# Patient Record
Sex: FEMALE | Race: BLACK OR AFRICAN AMERICAN | NOT HISPANIC OR LATINO | Employment: FULL TIME
[De-identification: names, ages, dates, MRNs, and addresses within clinical notes are randomized per-mention and may not be internally consistent; named-entity substitution may affect disease eponyms.]

---

## 2017-06-01 ENCOUNTER — SURGERY (OUTPATIENT)
Age: 57
End: 2017-06-01

## 2017-06-01 ENCOUNTER — ANESTHESIA (OUTPATIENT)
Dept: SURGERY | Facility: HOSPITAL | Age: 57
DRG: 572 | End: 2017-06-01
Payer: COMMERCIAL

## 2017-06-01 ENCOUNTER — HOSPITAL ENCOUNTER (INPATIENT)
Facility: HOSPITAL | Age: 57
LOS: 3 days | Discharge: HOME | DRG: 572 | End: 2017-06-04
Attending: EMERGENCY MEDICINE | Admitting: SURGERY
Payer: COMMERCIAL

## 2017-06-01 ENCOUNTER — ANESTHESIA EVENT (OUTPATIENT)
Dept: SURGERY | Facility: HOSPITAL | Age: 57
DRG: 572 | End: 2017-06-01
Payer: COMMERCIAL

## 2017-06-01 DIAGNOSIS — L02.411 ABSCESS OF AXILLA, RIGHT: Primary | ICD-10-CM

## 2017-06-01 LAB
ALBUMIN SERPL BCP-MCNC: 3.4 G/DL
ALP SERPL-CCNC: 61 U/L
ALT SERPL W/O P-5'-P-CCNC: 14 U/L
ANION GAP SERPL CALC-SCNC: 9 MMOL/L
AST SERPL-CCNC: 12 U/L
BASOPHILS # BLD AUTO: 0.02 K/UL
BASOPHILS NFR BLD: 0.1 %
BILIRUB SERPL-MCNC: 0.8 MG/DL
BUN SERPL-MCNC: 10 MG/DL
CALCIUM SERPL-MCNC: 9.8 MG/DL
CHLORIDE SERPL-SCNC: 104 MMOL/L
CO2 SERPL-SCNC: 27 MMOL/L
CREAT SERPL-MCNC: 1 MG/DL
DIFFERENTIAL METHOD: ABNORMAL
EOSINOPHIL # BLD AUTO: 0.4 K/UL
EOSINOPHIL NFR BLD: 3 %
ERYTHROCYTE [DISTWIDTH] IN BLOOD BY AUTOMATED COUNT: 14.2 %
EST. GFR  (AFRICAN AMERICAN): >60 ML/MIN/1.73 M^2
EST. GFR  (NON AFRICAN AMERICAN): >60 ML/MIN/1.73 M^2
GLUCOSE SERPL-MCNC: 113 MG/DL
HCT VFR BLD AUTO: 39.2 %
HGB BLD-MCNC: 13.2 G/DL
LYMPHOCYTES # BLD AUTO: 2.1 K/UL
LYMPHOCYTES NFR BLD: 14.9 %
MCH RBC QN AUTO: 30.6 PG
MCHC RBC AUTO-ENTMCNC: 33.7 %
MCV RBC AUTO: 91 FL
MONOCYTES # BLD AUTO: 0.6 K/UL
MONOCYTES NFR BLD: 4.6 %
NEUTROPHILS # BLD AUTO: 10.7 K/UL
NEUTROPHILS NFR BLD: 77.4 %
PLATELET # BLD AUTO: 163 K/UL
PMV BLD AUTO: 11.5 FL
POTASSIUM SERPL-SCNC: 4 MMOL/L
PROT SERPL-MCNC: 7.1 G/DL
RBC # BLD AUTO: 4.31 M/UL
SODIUM SERPL-SCNC: 140 MMOL/L
WBC # BLD AUTO: 13.77 K/UL

## 2017-06-01 PROCEDURE — 25000003 PHARM REV CODE 250: Performed by: NURSE ANESTHETIST, CERTIFIED REGISTERED

## 2017-06-01 PROCEDURE — 85025 COMPLETE CBC W/AUTO DIFF WBC: CPT

## 2017-06-01 PROCEDURE — 63600175 PHARM REV CODE 636 W HCPCS: Performed by: SURGERY

## 2017-06-01 PROCEDURE — 80053 COMPREHEN METABOLIC PANEL: CPT

## 2017-06-01 PROCEDURE — 88304 TISSUE EXAM BY PATHOLOGIST: CPT | Mod: 26,,, | Performed by: PATHOLOGY

## 2017-06-01 PROCEDURE — 99222 1ST HOSP IP/OBS MODERATE 55: CPT | Mod: 25,,, | Performed by: PHYSICIAN ASSISTANT

## 2017-06-01 PROCEDURE — 11000001 HC ACUTE MED/SURG PRIVATE ROOM

## 2017-06-01 PROCEDURE — 0JBD0ZZ EXCISION OF RIGHT UPPER ARM SUBCUTANEOUS TISSUE AND FASCIA, OPEN APPROACH: ICD-10-PCS | Performed by: SURGERY

## 2017-06-01 PROCEDURE — 25000003 PHARM REV CODE 250: Performed by: SURGERY

## 2017-06-01 PROCEDURE — 99900035 HC TECH TIME PER 15 MIN (STAT)

## 2017-06-01 PROCEDURE — 10061 I&D ABSCESS COMP/MULTIPLE: CPT | Mod: ,,, | Performed by: SURGERY

## 2017-06-01 PROCEDURE — 27000221 HC OXYGEN, UP TO 24 HOURS

## 2017-06-01 PROCEDURE — 25000003 PHARM REV CODE 250: Performed by: ANESTHESIOLOGY

## 2017-06-01 PROCEDURE — 87075 CULTR BACTERIA EXCEPT BLOOD: CPT

## 2017-06-01 PROCEDURE — 87070 CULTURE OTHR SPECIMN AEROBIC: CPT

## 2017-06-01 PROCEDURE — 36000705 HC OR TIME LEV I EA ADD 15 MIN: Performed by: SURGERY

## 2017-06-01 PROCEDURE — 88304 TISSUE EXAM BY PATHOLOGIST: CPT | Performed by: PATHOLOGY

## 2017-06-01 PROCEDURE — 63600175 PHARM REV CODE 636 W HCPCS: Performed by: NURSE ANESTHETIST, CERTIFIED REGISTERED

## 2017-06-01 PROCEDURE — 37000009 HC ANESTHESIA EA ADD 15 MINS: Performed by: SURGERY

## 2017-06-01 PROCEDURE — 63600175 PHARM REV CODE 636 W HCPCS: Performed by: ANESTHESIOLOGY

## 2017-06-01 PROCEDURE — 71000033 HC RECOVERY, INTIAL HOUR: Performed by: SURGERY

## 2017-06-01 PROCEDURE — 37000008 HC ANESTHESIA 1ST 15 MINUTES: Performed by: SURGERY

## 2017-06-01 PROCEDURE — 36000704 HC OR TIME LEV I 1ST 15 MIN: Performed by: SURGERY

## 2017-06-01 PROCEDURE — 99283 EMERGENCY DEPT VISIT LOW MDM: CPT

## 2017-06-01 RX ORDER — CHOLECALCIFEROL (VITAMIN D3) 25 MCG
1000 TABLET ORAL DAILY
COMMUNITY
End: 2017-07-25

## 2017-06-01 RX ORDER — ALLOPURINOL 300 MG/1
300 TABLET ORAL DAILY
COMMUNITY
End: 2017-07-25

## 2017-06-01 RX ORDER — BUPIVACAINE HYDROCHLORIDE 2.5 MG/ML
INJECTION, SOLUTION EPIDURAL; INFILTRATION; INTRACAUDAL
Status: DISCONTINUED | OUTPATIENT
Start: 2017-06-01 | End: 2017-06-01 | Stop reason: HOSPADM

## 2017-06-01 RX ORDER — LACTULOSE 10 G/15ML
20 SOLUTION ORAL EVERY 6 HOURS PRN
Status: DISCONTINUED | OUTPATIENT
Start: 2017-06-01 | End: 2017-06-03

## 2017-06-01 RX ORDER — FENTANYL CITRATE 50 UG/ML
INJECTION, SOLUTION INTRAMUSCULAR; INTRAVENOUS
Status: DISCONTINUED | OUTPATIENT
Start: 2017-06-01 | End: 2017-06-01

## 2017-06-01 RX ORDER — TRAMADOL HYDROCHLORIDE 50 MG/1
50 TABLET ORAL EVERY 6 HOURS PRN
COMMUNITY
End: 2019-12-11 | Stop reason: ALTCHOICE

## 2017-06-01 RX ORDER — ONDANSETRON 8 MG/1
8 TABLET, ORALLY DISINTEGRATING ORAL EVERY 8 HOURS PRN
Status: DISCONTINUED | OUTPATIENT
Start: 2017-06-01 | End: 2017-06-03

## 2017-06-01 RX ORDER — ONDANSETRON 2 MG/ML
INJECTION INTRAMUSCULAR; INTRAVENOUS
Status: DISCONTINUED | OUTPATIENT
Start: 2017-06-01 | End: 2017-06-01

## 2017-06-01 RX ORDER — ONDANSETRON 2 MG/ML
8 INJECTION INTRAMUSCULAR; INTRAVENOUS EVERY 8 HOURS PRN
Status: DISCONTINUED | OUTPATIENT
Start: 2017-06-01 | End: 2017-06-01

## 2017-06-01 RX ORDER — SODIUM CHLORIDE 9 MG/ML
3 INJECTION, SOLUTION INTRAMUSCULAR; INTRAVENOUS; SUBCUTANEOUS
Status: DISCONTINUED | OUTPATIENT
Start: 2017-06-01 | End: 2017-06-01 | Stop reason: HOSPADM

## 2017-06-01 RX ORDER — ALPRAZOLAM 0.5 MG/1
0.5 TABLET ORAL 3 TIMES DAILY
COMMUNITY
End: 2017-07-25

## 2017-06-01 RX ORDER — DEXAMETHASONE SODIUM PHOSPHATE 4 MG/ML
INJECTION, SOLUTION INTRA-ARTICULAR; INTRALESIONAL; INTRAMUSCULAR; INTRAVENOUS; SOFT TISSUE
Status: DISCONTINUED | OUTPATIENT
Start: 2017-06-01 | End: 2017-06-01

## 2017-06-01 RX ORDER — DIPHENHYDRAMINE HYDROCHLORIDE 50 MG/ML
25 INJECTION INTRAMUSCULAR; INTRAVENOUS EVERY 4 HOURS PRN
Status: DISCONTINUED | OUTPATIENT
Start: 2017-06-01 | End: 2017-06-03

## 2017-06-01 RX ORDER — MORPHINE SULFATE 2 MG/ML
2 INJECTION, SOLUTION INTRAMUSCULAR; INTRAVENOUS
Status: DISCONTINUED | OUTPATIENT
Start: 2017-06-01 | End: 2017-06-03

## 2017-06-01 RX ORDER — MEPERIDINE HYDROCHLORIDE 50 MG/ML
12.5 INJECTION INTRAMUSCULAR; INTRAVENOUS; SUBCUTANEOUS ONCE AS NEEDED
Status: COMPLETED | OUTPATIENT
Start: 2017-06-01 | End: 2017-06-01

## 2017-06-01 RX ORDER — OXYCODONE HYDROCHLORIDE 5 MG/1
5 TABLET ORAL
Status: DISCONTINUED | OUTPATIENT
Start: 2017-06-01 | End: 2017-06-01 | Stop reason: HOSPADM

## 2017-06-01 RX ORDER — CLONIDINE HYDROCHLORIDE 0.1 MG/1
0.1 TABLET ORAL EVERY 6 HOURS PRN
Status: DISCONTINUED | OUTPATIENT
Start: 2017-06-01 | End: 2017-06-04 | Stop reason: HOSPADM

## 2017-06-01 RX ORDER — PROPOFOL 10 MG/ML
VIAL (ML) INTRAVENOUS
Status: DISCONTINUED | OUTPATIENT
Start: 2017-06-01 | End: 2017-06-01

## 2017-06-01 RX ORDER — POTASSIUM CHLORIDE 20 MEQ/1
20 TABLET, EXTENDED RELEASE ORAL DAILY
COMMUNITY
End: 2020-07-12

## 2017-06-01 RX ORDER — METOPROLOL SUCCINATE 50 MG/1
100 TABLET, EXTENDED RELEASE ORAL DAILY
Status: DISCONTINUED | OUTPATIENT
Start: 2017-06-02 | End: 2017-06-04 | Stop reason: HOSPADM

## 2017-06-01 RX ORDER — SUCCINYLCHOLINE CHLORIDE 20 MG/ML
INJECTION INTRAMUSCULAR; INTRAVENOUS
Status: DISCONTINUED | OUTPATIENT
Start: 2017-06-01 | End: 2017-06-01

## 2017-06-01 RX ORDER — RAMELTEON 8 MG/1
8 TABLET ORAL NIGHTLY PRN
Status: DISCONTINUED | OUTPATIENT
Start: 2017-06-01 | End: 2017-06-03

## 2017-06-01 RX ORDER — POTASSIUM CHLORIDE 20 MEQ/1
20 TABLET, EXTENDED RELEASE ORAL DAILY
Status: DISCONTINUED | OUTPATIENT
Start: 2017-06-02 | End: 2017-06-04 | Stop reason: HOSPADM

## 2017-06-01 RX ORDER — ACETAMINOPHEN 325 MG/1
650 TABLET ORAL EVERY 6 HOURS PRN
Status: DISCONTINUED | OUTPATIENT
Start: 2017-06-01 | End: 2017-06-03

## 2017-06-01 RX ORDER — HYDROMORPHONE HYDROCHLORIDE 2 MG/ML
0.2 INJECTION, SOLUTION INTRAMUSCULAR; INTRAVENOUS; SUBCUTANEOUS EVERY 5 MIN PRN
Status: DISCONTINUED | OUTPATIENT
Start: 2017-06-01 | End: 2017-06-01 | Stop reason: HOSPADM

## 2017-06-01 RX ORDER — METOPROLOL SUCCINATE 100 MG/1
100 TABLET, EXTENDED RELEASE ORAL 2 TIMES DAILY
COMMUNITY
End: 2020-07-12

## 2017-06-01 RX ORDER — OLMESARTAN MEDOXOMIL / AMLODIPINE BESYLATE / HYDROCHLOROTHIAZIDE 40; 10; 12.5 MG/1; MG/1; MG/1
TABLET, FILM COATED ORAL
COMMUNITY
End: 2020-04-13

## 2017-06-01 RX ORDER — CLINDAMYCIN PHOSPHATE 900 MG/50ML
900 INJECTION, SOLUTION INTRAVENOUS
Status: DISCONTINUED | OUTPATIENT
Start: 2017-06-01 | End: 2017-06-04 | Stop reason: HOSPADM

## 2017-06-01 RX ORDER — DIPHENHYDRAMINE HYDROCHLORIDE 50 MG/ML
25 INJECTION INTRAMUSCULAR; INTRAVENOUS EVERY 6 HOURS PRN
Status: DISCONTINUED | OUTPATIENT
Start: 2017-06-01 | End: 2017-06-01 | Stop reason: HOSPADM

## 2017-06-01 RX ORDER — DOXAZOSIN 2 MG/1
2 TABLET ORAL NIGHTLY
Status: DISCONTINUED | OUTPATIENT
Start: 2017-06-01 | End: 2017-06-04 | Stop reason: HOSPADM

## 2017-06-01 RX ORDER — FENTANYL CITRATE 50 UG/ML
25 INJECTION, SOLUTION INTRAMUSCULAR; INTRAVENOUS EVERY 5 MIN PRN
Status: DISCONTINUED | OUTPATIENT
Start: 2017-06-01 | End: 2017-06-01 | Stop reason: HOSPADM

## 2017-06-01 RX ORDER — LIDOCAINE HCL/PF 100 MG/5ML
SYRINGE (ML) INTRAVENOUS
Status: DISCONTINUED | OUTPATIENT
Start: 2017-06-01 | End: 2017-06-01

## 2017-06-01 RX ORDER — SODIUM CHLORIDE, SODIUM LACTATE, POTASSIUM CHLORIDE, CALCIUM CHLORIDE 600; 310; 30; 20 MG/100ML; MG/100ML; MG/100ML; MG/100ML
INJECTION, SOLUTION INTRAVENOUS CONTINUOUS PRN
Status: DISCONTINUED | OUTPATIENT
Start: 2017-06-01 | End: 2017-06-01

## 2017-06-01 RX ORDER — HYDROCODONE BITARTRATE AND ACETAMINOPHEN 5; 325 MG/1; MG/1
1 TABLET ORAL EVERY 4 HOURS PRN
Status: DISCONTINUED | OUTPATIENT
Start: 2017-06-01 | End: 2017-06-03

## 2017-06-01 RX ORDER — BISACODYL 10 MG
10 SUPPOSITORY, RECTAL RECTAL DAILY PRN
Status: DISCONTINUED | OUTPATIENT
Start: 2017-06-01 | End: 2017-06-03

## 2017-06-01 RX ORDER — ONDANSETRON 2 MG/ML
4 INJECTION INTRAMUSCULAR; INTRAVENOUS DAILY PRN
Status: DISCONTINUED | OUTPATIENT
Start: 2017-06-01 | End: 2017-06-01 | Stop reason: HOSPADM

## 2017-06-01 RX ORDER — MORPHINE SULFATE 2 MG/ML
1 INJECTION, SOLUTION INTRAMUSCULAR; INTRAVENOUS EVERY 4 HOURS PRN
Status: DISCONTINUED | OUTPATIENT
Start: 2017-06-01 | End: 2017-06-01

## 2017-06-01 RX ORDER — OMEPRAZOLE 20 MG/1
20 CAPSULE, DELAYED RELEASE ORAL DAILY
COMMUNITY

## 2017-06-01 RX ORDER — SODIUM CHLORIDE, SODIUM LACTATE, POTASSIUM CHLORIDE, CALCIUM CHLORIDE 600; 310; 30; 20 MG/100ML; MG/100ML; MG/100ML; MG/100ML
INJECTION, SOLUTION INTRAVENOUS CONTINUOUS
Status: DISCONTINUED | OUTPATIENT
Start: 2017-06-01 | End: 2017-06-03

## 2017-06-01 RX ORDER — DOXAZOSIN 2 MG/1
2 TABLET ORAL NIGHTLY
COMMUNITY
End: 2020-01-17

## 2017-06-01 RX ORDER — ACETAMINOPHEN 10 MG/ML
1000 INJECTION, SOLUTION INTRAVENOUS ONCE
Status: COMPLETED | OUTPATIENT
Start: 2017-06-01 | End: 2017-06-01

## 2017-06-01 RX ORDER — METRONIDAZOLE 500 MG/100ML
500 INJECTION, SOLUTION INTRAVENOUS
Status: DISCONTINUED | OUTPATIENT
Start: 2017-06-01 | End: 2017-06-04 | Stop reason: HOSPADM

## 2017-06-01 RX ORDER — LORAZEPAM 2 MG/ML
0.25 INJECTION INTRAMUSCULAR ONCE AS NEEDED
Status: DISCONTINUED | OUTPATIENT
Start: 2017-06-01 | End: 2017-06-01 | Stop reason: HOSPADM

## 2017-06-01 RX ORDER — ROCURONIUM BROMIDE 10 MG/ML
INJECTION, SOLUTION INTRAVENOUS
Status: DISCONTINUED | OUTPATIENT
Start: 2017-06-01 | End: 2017-06-01

## 2017-06-01 RX ORDER — SODIUM CHLORIDE 9 MG/ML
3 INJECTION, SOLUTION INTRAMUSCULAR; INTRAVENOUS; SUBCUTANEOUS EVERY 8 HOURS
Status: DISCONTINUED | OUTPATIENT
Start: 2017-06-01 | End: 2017-06-01 | Stop reason: HOSPADM

## 2017-06-01 RX ORDER — AMOXICILLIN 250 MG
1 CAPSULE ORAL 2 TIMES DAILY
Status: DISCONTINUED | OUTPATIENT
Start: 2017-06-01 | End: 2017-06-03

## 2017-06-01 RX ADMIN — STANDARDIZED SENNA CONCENTRATE AND DOCUSATE SODIUM 1 TABLET: 8.6; 5 TABLET, FILM COATED ORAL at 10:06

## 2017-06-01 RX ADMIN — DEXAMETHASONE SODIUM PHOSPHATE 4 MG: 4 INJECTION, SOLUTION INTRA-ARTICULAR; INTRALESIONAL; INTRAMUSCULAR; INTRAVENOUS; SOFT TISSUE at 02:06

## 2017-06-01 RX ADMIN — FENTANYL CITRATE 50 MCG: 50 INJECTION, SOLUTION INTRAMUSCULAR; INTRAVENOUS at 03:06

## 2017-06-01 RX ADMIN — BUPIVACAINE HYDROCHLORIDE 30 ML: 2.5 INJECTION, SOLUTION EPIDURAL; INFILTRATION; INTRACAUDAL; PERINEURAL at 02:06

## 2017-06-01 RX ADMIN — MEPERIDINE HYDROCHLORIDE 12.5 MG: 50 INJECTION INTRAMUSCULAR; INTRAVENOUS; SUBCUTANEOUS at 03:06

## 2017-06-01 RX ADMIN — METRONIDAZOLE 500 MG: 500 INJECTION, SOLUTION INTRAVENOUS at 04:06

## 2017-06-01 RX ADMIN — PROPOFOL 160 MG: 10 INJECTION, EMULSION INTRAVENOUS at 02:06

## 2017-06-01 RX ADMIN — CLINDAMYCIN IN 5 PERCENT DEXTROSE 900 MG: 18 INJECTION, SOLUTION INTRAVENOUS at 04:06

## 2017-06-01 RX ADMIN — HYDROCODONE BITARTRATE AND ACETAMINOPHEN 1 TABLET: 5; 325 TABLET ORAL at 10:06

## 2017-06-01 RX ADMIN — PROMETHAZINE HYDROCHLORIDE 6.25 MG: 25 INJECTION INTRAMUSCULAR; INTRAVENOUS at 05:06

## 2017-06-01 RX ADMIN — SUCCINYLCHOLINE CHLORIDE 100 MG: 20 INJECTION, SOLUTION INTRAMUSCULAR; INTRAVENOUS at 02:06

## 2017-06-01 RX ADMIN — ACETAMINOPHEN 1000 MG: 10 INJECTION, SOLUTION INTRAVENOUS at 03:06

## 2017-06-01 RX ADMIN — HYDROMORPHONE HYDROCHLORIDE 0.2 MG: 2 INJECTION, SOLUTION INTRAMUSCULAR; INTRAVENOUS; SUBCUTANEOUS at 03:06

## 2017-06-01 RX ADMIN — SODIUM CHLORIDE, SODIUM LACTATE, POTASSIUM CHLORIDE, AND CALCIUM CHLORIDE: 600; 310; 30; 20 INJECTION, SOLUTION INTRAVENOUS at 02:06

## 2017-06-01 RX ADMIN — FENTANYL CITRATE 50 MCG: 50 INJECTION, SOLUTION INTRAMUSCULAR; INTRAVENOUS at 02:06

## 2017-06-01 RX ADMIN — ONDANSETRON 4 MG: 2 INJECTION, SOLUTION INTRAMUSCULAR; INTRAVENOUS at 02:06

## 2017-06-01 RX ADMIN — LIDOCAINE HYDROCHLORIDE 40 MG: 20 INJECTION, SOLUTION INTRAVENOUS at 02:06

## 2017-06-01 RX ADMIN — SODIUM CHLORIDE, SODIUM LACTATE, POTASSIUM CHLORIDE, AND CALCIUM CHLORIDE: .6; .31; .03; .02 INJECTION, SOLUTION INTRAVENOUS at 04:06

## 2017-06-01 RX ADMIN — ROCURONIUM BROMIDE 5 MG: 10 INJECTION, SOLUTION INTRAVENOUS at 02:06

## 2017-06-01 RX ADMIN — RAMELTEON 8 MG: 8 TABLET, FILM COATED ORAL at 10:06

## 2017-06-01 NOTE — TRANSFER OF CARE
"Anesthesia Transfer of Care Note    Patient: Dianne Hernández    Procedure(s) Performed: Procedure(s) (LRB):  INCISION AND DRAINAGE (I&D), ABSCESS (Right)    Patient location: PACU    Anesthesia Type: general    Transport from OR: Transported from OR on room air with adequate spontaneous ventilation    Post pain: adequate analgesia    Post assessment: no apparent anesthetic complications    Post vital signs: stable    Level of consciousness: responds to stimulation    Nausea/Vomiting: no nausea/vomiting    Complications: none    Transfer of care protocol was followed      Last vitals:   Visit Vitals  BP (!) 108/59 (BP Location: Right arm, Patient Position: Lying, BP Method: Automatic)   Pulse 81   Temp 37.1 °C (98.8 °F) (Oral)   Resp 18   Ht 5' 7" (1.702 m)   Wt 109.8 kg (242 lb)   SpO2 96%   Breastfeeding? No   BMI 37.90 kg/m²     "

## 2017-06-01 NOTE — HOSPITAL COURSE
The patient was admitted for I&D 06/01/2017  She underwent debridement of right axillary abscess.  Stable for discharge on 6/4/17.

## 2017-06-01 NOTE — ANESTHESIA RELEASE NOTE
"Anesthesia Release from PACU Note    Patient: Dianne Hernández    Procedure(s) Performed: Procedure(s) (LRB):  INCISION AND DRAINAGE (I&D), ABSCESS (Right)    Anesthesia type: general    Post pain: Adequate analgesia    Post assessment: no apparent anesthetic complications, tolerated procedure well and no evidence of recall    Last Vitals:   Visit Vitals  BP (!) 90/55   Pulse 70   Temp 36.6 °C (97.9 °F) (Temporal)   Resp 18   Ht 5' 7" (1.702 m)   Wt 109.8 kg (242 lb)   SpO2 98%   Breastfeeding? No   BMI 37.90 kg/m²       Post vital signs: stable    Level of consciousness: responds to stimulation    Nausea/Vomiting: no nausea/no vomiting    Complications: none    Airway Patency: patent    Respiratory: unassisted    Cardiovascular: stable and blood pressure at baseline    Hydration: euvolemic  "

## 2017-06-01 NOTE — ANESTHESIA PREPROCEDURE EVALUATION
06/01/2017  Dianne Hernández is a 56 y.o., female.    Anesthesia Evaluation    I have reviewed the Patient Summary Reports.    I have reviewed the Nursing Notes.   I have reviewed the Medications.     Review of Systems  Anesthesia Hx:  No problems with previous Anesthesia  Denies Family Hx of Anesthesia complications.   Denies Personal Hx of Anesthesia complications.   Social:  Non-Smoker    Hematology/Oncology:     Oncology Normal     EENT/Dental:EENT/Dental Normal   Cardiovascular:   Exercise tolerance: good Hypertension, well controlled    Pulmonary:  Pulmonary Normal    Renal/:  Renal/ Normal     Hepatic/GI:   GERD, well controlled    Musculoskeletal:  Musculoskeletal Normal    Neurological:  Neurology Normal    Endocrine:  Endocrine Normal    Dermatological:  Skin Normal    Psych:  Psychiatric Normal           Physical Exam  General:  Morbid Obesity    Airway/Jaw/Neck:  Airway Findings: Tongue: Normal, Large General Airway Assessment: Adult, Average  Mallampati: II  TM Distance: Normal, at least 6 cm      Dental:  Dental Findings: In tact   Chest/Lungs:  Chest/Lungs Findings: Clear to auscultation, Normal Respiratory Rate     Heart/Vascular:  Heart Findings: Rate: Normal  Rhythm: Regular Rhythm  Sounds: Normal        Mental Status:  Mental Status Findings:  Cooperative, Alert and Oriented         Anesthesia Plan  Type of Anesthesia, risks & benefits discussed:  Anesthesia Type:  general  Patient's Preference:   Intra-op Monitoring Plan:   Intra-op Monitoring Plan Comments:   Post Op Pain Control Plan:   Post Op Pain Control Plan Comments:   Induction:   IV  Beta Blocker:  Patient is on a Beta-Blocker and has received one dose within the past 24 hours (No further documentation required).       Informed Consent: Patient understands risks and agrees with Anesthesia plan.  Questions answered. Anesthesia  consent signed with patient.  ASA Score: 2     Day of Surgery Review of History & Physical: I have interviewed and examined the patient. I have reviewed the patient's H&P dated: 6/1/17. There are no significant changes.  H&P update referred to the surgeon.         Ready For Surgery From Anesthesia Perspective.

## 2017-06-01 NOTE — PLAN OF CARE
Pt lying in bed. Pt reports a little bit of pain. Family updated upon pt arrival to recovery room.

## 2017-06-01 NOTE — ED NOTES
Attempted report with patria diaz on observation in [place] and states will have to call her back in 15 min

## 2017-06-01 NOTE — ED PROVIDER NOTES
"Encounter Date: 6/1/2017       History     Chief Complaint   Patient presents with    Abscess     "boil under my arm" right axilla tender area increasing x 2 weeks     Review of patient's allergies indicates:  No Known Allergies  The history is provided by the patient.   Abscess    This is a new problem. The current episode started several weeks ago (over 2 weeks ago). The problem occurs rarely. The problem has been gradually worsening. Affected Location: right axilla. Pain scale: moderate. The abscess is characterized by redness, painfulness, swelling and blistering. Pertinent negatives include no anorexia, no decrease in physical activity, not sleeping less, not drinking less, no fever, no diarrhea, no vomiting, no congestion, no rhinorrhea, no sore throat, no decreased responsiveness and no cough. Her past medical history does not include atopy in family or skin abscesses in family. There were no sick contacts. She has received no recent medical care.     Past Medical History:   Diagnosis Date    Arthritis     GERD (gastroesophageal reflux disease)     Hypertension      Past Surgical History:   Procedure Laterality Date    COLONOSCOPY       Family History   Problem Relation Age of Onset    Cancer Mother     Diabetes Mother     Hypertension Mother     Diabetes Sister     Hypertension Sister     Cancer Brother      Social History   Substance Use Topics    Smoking status: Never Smoker    Smokeless tobacco: Not on file    Alcohol use No     Review of Systems   Constitutional: Negative for decreased responsiveness and fever.   HENT: Negative for congestion, rhinorrhea and sore throat.    Respiratory: Negative for cough and shortness of breath.    Cardiovascular: Negative for chest pain.   Gastrointestinal: Negative for anorexia, diarrhea, nausea and vomiting.   Genitourinary: Negative for dysuria.   Musculoskeletal: Negative for back pain.   Skin: Negative for rash.   Neurological: Negative for weakness. " "  Hematological: Does not bruise/bleed easily.   All other systems reviewed and are negative.      Physical Exam     Initial Vitals [06/01/17 0947]   BP Pulse Resp Temp SpO2   111/72 94 18 99 °F (37.2 °C) 97 %     Physical Exam    Nursing note and vitals reviewed.  Constitutional: She appears well-developed and well-nourished.   HENT:   Head: Normocephalic and atraumatic.   Mouth/Throat: No oropharyngeal exudate.   Eyes: Conjunctivae and EOM are normal. Pupils are equal, round, and reactive to light.   Neck: Normal range of motion. Neck supple. No thyromegaly present.   Cardiovascular: Normal rate, regular rhythm, normal heart sounds and intact distal pulses. Exam reveals no gallop and no friction rub.    No murmur heard.  Pulmonary/Chest: Breath sounds normal. No respiratory distress. She has no wheezes. She has no rhonchi. She exhibits no tenderness.   Abdominal: Soft. Bowel sounds are normal. She exhibits no distension. There is no tenderness. There is no rebound and no guarding.   Musculoskeletal: Normal range of motion. She exhibits no edema or tenderness.   Lymphadenopathy:     She has no cervical adenopathy.   Neurological: She is alert and oriented to person, place, and time. She has normal strength. No cranial nerve deficit or sensory deficit.   Skin: Skin is warm and dry. Abscess noted. No rash noted.        Psychiatric: She has a normal mood and affect. Her behavior is normal. Judgment and thought content normal.             ED Course   Procedures  Labs Reviewed          Vitals:    06/01/17 0947 06/01/17 1200   BP: 111/72 (!) 108/59   Pulse: 94 81   Resp: 18 18   Temp: 99 °F (37.2 °C) 98.8 °F (37.1 °C)   TempSrc: Oral Oral   SpO2: 97% 96%   Weight: 109.8 kg (242 lb) 109.8 kg (242 lb)   Height:  5' 7" (1.702 m)       No results found for this or any previous visit.      Imaging Results    None         Medications   morphine injection 1 mg (not administered)   ondansetron injection 8 mg (not administered) "       10:23 AM - CONSULT: Dr. Krueger discussed the case with Dr. Hopper. Agrees with current management. Recommends NPO, pt ok to drive POV. Will further evaluate and treat upon arrival to hospital room.  Admitting Service: General Surgery   Admitting Physician: Dr. Hopper   Admit to obs med surg    10:23 AM - Re-evaluation:  Discussed test results, shared treatment plan, and the need for admission with patient and family. They verbally understand and agree to the plan as discussed.  Furthermore, pt states she will go directly to [facility] in [place] (address and directions given, pt states she knows where hospital is), will not eat or drink, and will not go home before. Answered questions at this time.     All historical, clinical, radiographic, and laboratory findings were reviewed with the patient/family in detail along with the indications for transport to the facility in [place] in order to receive gen surg evaluation of right axillary abscess.  All remaining questions and concerns were addressed at this time and the patient/family agrees to proceed accordingly.  Similarly, all pertinent details of the encounter were discussed with Dr. Hopper who agrees to receive the patient at [facility] for further care as outlined above.  The patient will be transferred by POV to go directly to [facility] hospital in [place].  10:43 AM      Pre-hypertension/Hypertension: The pt has been informed that they may have pre-hypertension or hypertension based on a blood pressure reading in the ED. I recommend that the pt call the PCP listed on their discharge instructions or a physician of their choice this week to arrange f/u for further evaluation of possible pre-hypertension or hypertension.     Dianne Hernández was given a handout which discussed their disease process, precautions, and instructions for follow-up and therapy.         Current Discharge Medication List             ED Diagnosis  1. Abscess of axilla, right                              ED Course     Clinical Impression:   The encounter diagnosis was Abscess of axilla, right.    Disposition:   Disposition: Placed in Observation (will go POV, per pt request.)  Condition: Stable       Viktor Krueger Jr., MD  06/01/17 7837

## 2017-06-01 NOTE — ED NOTES
Dr. Krueger spoke with Dr. Hopper and Dr. Krueger states accepted for observation and okay to go per private vehicle.

## 2017-06-01 NOTE — OP NOTE
Operative Note       SURGERY DATE:  06/01/2017    PRE-OP DIAGNOSIS:  Abscess of axilla, right [L02.411]    POST-OP DIAGNOSIS:  Abscess of axilla, right [L02.411]    Procedure(s) (LRB):  INCISION AND DRAINAGE (I&D), ABSCESS (Right)    Surgeon(s) and Role:     * Jessee Hopper MD - Primary    ASSISTANTS: None  ANESTHESIA: General    FINDINGS: The abscess cavity was located in the right axilla and extended beyond the Latissimus and necrosis of the muscle was also noted. Antwan, foul odor greenish pus was drained.    ESTIMATED BLOOD LOSS: 10 mL              COMPLICATIONS:  None    SPECIMEN:  abscess wall    Implants: None    INDICATION:  Acute infection and abscess in the right axilla.    DESCRIPTION OF PROCEDURE:  The patient was taken to the operating room and placed in supine position. The patient was intubated with an endotracheal tube, and then positioned in a supine position. The right axilla was prepared and draped in the sterile fashion. A sharp incision was made over the abscess cavity which was measured about 15 by 6 cm . The abscess cavity was probed digitally in all four directions, broke up the loculations with the abscess. A portion of the wall was removed and hemostasis was controlled. The wound was then dressed with a moist gauze. The patient will be brought back for the wash out and possible further debridement. The abscess was close to vital structures such as axillary vein. Further dissection was held and planned for return to OR in 48 hours.           CONDITION: Good    DISPOSITION: PACU - hemodynamically stable.     Jessee Hopper

## 2017-06-01 NOTE — H&P
[facility]  General Surgery  History & Physical    Patient Name: Dianne Hernández  MRN: [id number]  Admission Date: 6/1/2017  Attending Physician: Jessee Hopper MD   Primary Care Provider: Jamie Ferris MD    Patient information was obtained from patient and ER records.     Subjective:     Chief Complaint/Reason for Admission: abscess right axilla    History of Present Illness: Dianne Hernández presented to the [facility] ED today with complaints of a right axillary abscess that began about 3 weeks ago. Sx gradually worsened. She now has severe 7/10 pain, redness, and swelling of right axilla. She denies drainage from the area, fever, chills.     No current facility-administered medications on file prior to encounter.      No current outpatient prescriptions on file prior to encounter.       Review of patient's allergies indicates:  No Known Allergies    Past Medical History:   Diagnosis Date    Arthritis     GERD (gastroesophageal reflux disease)     Hypertension      History reviewed. No pertinent surgical history.  Family History     Problem Relation (Age of Onset)    Cancer Mother, Brother    Diabetes Mother, Sister    Hypertension Mother, Sister        Social History Main Topics    Smoking status: Never Smoker    Smokeless tobacco: Not on file    Alcohol use No    Drug use: No    Sexual activity: No     Review of Systems   Constitutional: Negative for activity change, chills and fever.   Respiratory: Negative for shortness of breath.    Cardiovascular: Negative for chest pain.   Gastrointestinal: Negative for abdominal pain, nausea and vomiting.   Genitourinary: Negative for dysuria.   Musculoskeletal: Negative for myalgias.   Skin: Positive for color change and wound.        See hpi     Neurological: Negative for weakness.   Hematological: Does not bruise/bleed easily.   Psychiatric/Behavioral: The patient is not nervous/anxious.      Objective:     Vital Signs (Most Recent):  Temp: 98.8 °F (37.1  °C) (06/01/17 1200)  Pulse: 81 (06/01/17 1200)  Resp: 18 (06/01/17 1200)  BP: (!) 108/59 (06/01/17 1200)  SpO2: 96 % (06/01/17 1200) Vital Signs (24h Range):  Temp:  [98.8 °F (37.1 °C)-99 °F (37.2 °C)] 98.8 °F (37.1 °C)  Pulse:  [81-94] 81  Resp:  [18] 18  SpO2:  [96 %-97 %] 96 %  BP: (108-111)/(59-72) 108/59     Weight: 109.8 kg (242 lb)  There is no height or weight on file to calculate BMI.    Physical Exam   Constitutional: She is oriented to person, place, and time. She appears well-developed and well-nourished.   HENT:   Head: Normocephalic and atraumatic.   Eyes: EOM are normal.   Cardiovascular: Normal rate and regular rhythm.    Pulmonary/Chest: Effort normal. No respiratory distress.   Musculoskeletal: Normal range of motion.   Neurological: She is alert and oriented to person, place, and time.   Skin: Skin is warm and dry.   Large area of induration, tenderness, fluctuance of the right axilla with large pustule.    Psychiatric: She has a normal mood and affect. Thought content normal.   Vitals reviewed.          Assessment/Plan:     * Abscess of axilla, right    Plan for I&D today.  Will get labs. Keep NPO. Start IV fluids, analgesics.   The procedure was discussed in detail, including risks and alternatives. The patient voices understanding and all questions were answered. The patient agrees to proceed as planned.            VTE Risk Mitigation         Ordered     Medium Risk of VTE  Once      06/01/17 1259     Place NICOLAS hose  Until discontinued      06/01/17 1259     Reason for No Pharmacological VTE Prophylaxis  Once      06/01/17 1259          Paige Davenport PA-C  General Surgery  [facility]

## 2017-06-01 NOTE — ANESTHESIA POSTPROCEDURE EVALUATION
"Anesthesia Post Evaluation    Patient: Dianne Hernández    Procedure(s) Performed: Procedure(s) (LRB):  INCISION AND DRAINAGE (I&D), ABSCESS (Right)    Final Anesthesia Type: general  Patient location during evaluation: PACU  Patient participation: Yes- Able to Participate  Level of consciousness: awake and alert  Post-procedure vital signs: reviewed and stable  Pain management: adequate  Airway patency: patent  PONV status at discharge: No PONV  Anesthetic complications: no      Cardiovascular status: blood pressure returned to baseline and hemodynamically stable  Respiratory status: unassisted  Hydration status: euvolemic  Follow-up not needed.        Visit Vitals  BP (!) 100/54   Pulse (!) 56   Temp 36.9 °C (98.5 °F) (Oral)   Resp 16   Ht 5' 7" (1.702 m)   Wt 109.8 kg (242 lb)   SpO2 (!) 90%   Breastfeeding? No   BMI 37.90 kg/m²       Pain/Abhay Score: Pain Assessment Performed: Yes (6/1/2017  3:15 PM)  Presence of Pain: complains of pain/discomfort (6/1/2017  4:30 PM)  Pain Rating Prior to Med Admin: 5 (6/1/2017  3:51 PM)  Abhay Score: 8 (6/1/2017  4:00 PM)      "

## 2017-06-01 NOTE — ED NOTES
Spoke with Cristela vidales and states pt room 234 and instruct pt to go directly to 2nd floor nurses station do not stop at registration and let them know your room 234 and you are direct admit from [facility]. Instructed pt and pt verbalized understanding.

## 2017-06-01 NOTE — ED NOTES
Pt instructed not to have anything to eat or drink until further ordered per Dr. Hopper. Pt states last food was at  6pm  & last drank at 7am.

## 2017-06-01 NOTE — SUBJECTIVE & OBJECTIVE
No current facility-administered medications on file prior to encounter.      No current outpatient prescriptions on file prior to encounter.       Review of patient's allergies indicates:  No Known Allergies    Past Medical History:   Diagnosis Date    Arthritis     GERD (gastroesophageal reflux disease)     Hypertension      History reviewed. No pertinent surgical history.  Family History     Problem Relation (Age of Onset)    Cancer Mother, Brother    Diabetes Mother, Sister    Hypertension Mother, Sister        Social History Main Topics    Smoking status: Never Smoker    Smokeless tobacco: Not on file    Alcohol use No    Drug use: No    Sexual activity: No     Review of Systems   Constitutional: Negative for activity change, chills and fever.   Respiratory: Negative for shortness of breath.    Cardiovascular: Negative for chest pain.   Gastrointestinal: Negative for abdominal pain, nausea and vomiting.   Genitourinary: Negative for dysuria.   Musculoskeletal: Negative for myalgias.   Skin: Positive for color change and wound.        See hpi     Neurological: Negative for weakness.   Hematological: Does not bruise/bleed easily.   Psychiatric/Behavioral: The patient is not nervous/anxious.      Objective:     Vital Signs (Most Recent):  Temp: 98.8 °F (37.1 °C) (06/01/17 1200)  Pulse: 81 (06/01/17 1200)  Resp: 18 (06/01/17 1200)  BP: (!) 108/59 (06/01/17 1200)  SpO2: 96 % (06/01/17 1200) Vital Signs (24h Range):  Temp:  [98.8 °F (37.1 °C)-99 °F (37.2 °C)] 98.8 °F (37.1 °C)  Pulse:  [81-94] 81  Resp:  [18] 18  SpO2:  [96 %-97 %] 96 %  BP: (108-111)/(59-72) 108/59     Weight: 109.8 kg (242 lb)  There is no height or weight on file to calculate BMI.    Physical Exam   Constitutional: She is oriented to person, place, and time. She appears well-developed and well-nourished.   HENT:   Head: Normocephalic and atraumatic.   Eyes: EOM are normal.   Cardiovascular: Normal rate and regular rhythm.     Pulmonary/Chest: Effort normal. No respiratory distress.   Musculoskeletal: Normal range of motion.   Neurological: She is alert and oriented to person, place, and time.   Skin: Skin is warm and dry.   Large area of induration, tenderness, fluctuance of the right axilla with large pustule.    Psychiatric: She has a normal mood and affect. Thought content normal.   Vitals reviewed.

## 2017-06-01 NOTE — ASSESSMENT & PLAN NOTE
Plan for I&D today.  Will get labs. Keep NPO. Start IV fluids, analgesics.   The procedure was discussed in detail, including risks and alternatives. The patient voices understanding and all questions were answered. The patient agrees to proceed as planned.

## 2017-06-01 NOTE — PLAN OF CARE
Problem: Patient Care Overview  Goal: Plan of Care Review  Outcome: Ongoing (interventions implemented as appropriate)  Plan of care reviewed and on going.

## 2017-06-01 NOTE — HPI
Dianne Hernández presented to the [facility] ED today with complaints of a right axillary abscess that began about 3 weeks ago. Sx gradually worsened. She now has severe 7/10 pain, redness, and swelling of right axilla. She denies drainage from the area, fever, chills.

## 2017-06-02 ENCOUNTER — ANESTHESIA EVENT (OUTPATIENT)
Dept: SURGERY | Facility: HOSPITAL | Age: 57
DRG: 572 | End: 2017-06-02
Payer: COMMERCIAL

## 2017-06-02 LAB
ANION GAP SERPL CALC-SCNC: 11 MMOL/L
BASOPHILS # BLD AUTO: 0.01 K/UL
BASOPHILS NFR BLD: 0.1 %
BUN SERPL-MCNC: 16 MG/DL
CALCIUM SERPL-MCNC: 9.3 MG/DL
CHLORIDE SERPL-SCNC: 105 MMOL/L
CO2 SERPL-SCNC: 23 MMOL/L
CREAT SERPL-MCNC: 0.8 MG/DL
DIFFERENTIAL METHOD: ABNORMAL
EOSINOPHIL # BLD AUTO: 0 K/UL
EOSINOPHIL NFR BLD: 0.3 %
ERYTHROCYTE [DISTWIDTH] IN BLOOD BY AUTOMATED COUNT: 14.2 %
EST. GFR  (AFRICAN AMERICAN): >60 ML/MIN/1.73 M^2
EST. GFR  (NON AFRICAN AMERICAN): >60 ML/MIN/1.73 M^2
GLUCOSE SERPL-MCNC: 131 MG/DL
HCT VFR BLD AUTO: 39.4 %
HGB BLD-MCNC: 13.1 G/DL
LYMPHOCYTES # BLD AUTO: 1.2 K/UL
LYMPHOCYTES NFR BLD: 7.8 %
MCH RBC QN AUTO: 30.2 PG
MCHC RBC AUTO-ENTMCNC: 33.2 %
MCV RBC AUTO: 91 FL
MONOCYTES # BLD AUTO: 0.6 K/UL
MONOCYTES NFR BLD: 3.9 %
NEUTROPHILS # BLD AUTO: 13 K/UL
NEUTROPHILS NFR BLD: 87.9 %
PLATELET # BLD AUTO: 193 K/UL
PMV BLD AUTO: 11.4 FL
POTASSIUM SERPL-SCNC: 4.3 MMOL/L
RBC # BLD AUTO: 4.34 M/UL
SODIUM SERPL-SCNC: 139 MMOL/L
WBC # BLD AUTO: 14.81 K/UL

## 2017-06-02 PROCEDURE — 97116 GAIT TRAINING THERAPY: CPT

## 2017-06-02 PROCEDURE — 80048 BASIC METABOLIC PNL TOTAL CA: CPT

## 2017-06-02 PROCEDURE — 85025 COMPLETE CBC W/AUTO DIFF WBC: CPT

## 2017-06-02 PROCEDURE — G8978 MOBILITY CURRENT STATUS: HCPCS | Mod: CH

## 2017-06-02 PROCEDURE — 97161 PT EVAL LOW COMPLEX 20 MIN: CPT

## 2017-06-02 PROCEDURE — 36415 COLL VENOUS BLD VENIPUNCTURE: CPT

## 2017-06-02 PROCEDURE — G8979 MOBILITY GOAL STATUS: HCPCS | Mod: CH

## 2017-06-02 PROCEDURE — 25000003 PHARM REV CODE 250: Performed by: SURGERY

## 2017-06-02 PROCEDURE — 94799 UNLISTED PULMONARY SVC/PX: CPT

## 2017-06-02 PROCEDURE — G8980 MOBILITY D/C STATUS: HCPCS | Mod: CH

## 2017-06-02 PROCEDURE — 11000001 HC ACUTE MED/SURG PRIVATE ROOM

## 2017-06-02 RX ADMIN — SODIUM CHLORIDE, SODIUM LACTATE, POTASSIUM CHLORIDE, AND CALCIUM CHLORIDE: .6; .31; .03; .02 INJECTION, SOLUTION INTRAVENOUS at 04:06

## 2017-06-02 RX ADMIN — HYDROCODONE BITARTRATE AND ACETAMINOPHEN 1 TABLET: 5; 325 TABLET ORAL at 04:06

## 2017-06-02 RX ADMIN — POTASSIUM CHLORIDE 20 MEQ: 1500 TABLET, EXTENDED RELEASE ORAL at 09:06

## 2017-06-02 RX ADMIN — METRONIDAZOLE 500 MG: 500 INJECTION, SOLUTION INTRAVENOUS at 12:06

## 2017-06-02 RX ADMIN — CLINDAMYCIN IN 5 PERCENT DEXTROSE 900 MG: 18 INJECTION, SOLUTION INTRAVENOUS at 09:06

## 2017-06-02 RX ADMIN — METOPROLOL SUCCINATE 100 MG: 50 TABLET, EXTENDED RELEASE ORAL at 09:06

## 2017-06-02 RX ADMIN — STANDARDIZED SENNA CONCENTRATE AND DOCUSATE SODIUM 1 TABLET: 8.6; 5 TABLET, FILM COATED ORAL at 09:06

## 2017-06-02 RX ADMIN — METRONIDAZOLE 500 MG: 500 INJECTION, SOLUTION INTRAVENOUS at 09:06

## 2017-06-02 RX ADMIN — CLINDAMYCIN IN 5 PERCENT DEXTROSE 900 MG: 18 INJECTION, SOLUTION INTRAVENOUS at 04:06

## 2017-06-02 RX ADMIN — STANDARDIZED SENNA CONCENTRATE AND DOCUSATE SODIUM 1 TABLET: 8.6; 5 TABLET, FILM COATED ORAL at 08:06

## 2017-06-02 RX ADMIN — CLINDAMYCIN IN 5 PERCENT DEXTROSE 900 MG: 18 INJECTION, SOLUTION INTRAVENOUS at 12:06

## 2017-06-02 RX ADMIN — HYDROCODONE BITARTRATE AND ACETAMINOPHEN 1 TABLET: 5; 325 TABLET ORAL at 09:06

## 2017-06-02 RX ADMIN — METRONIDAZOLE 500 MG: 500 INJECTION, SOLUTION INTRAVENOUS at 04:06

## 2017-06-02 RX ADMIN — HYDROCODONE BITARTRATE AND ACETAMINOPHEN 1 TABLET: 5; 325 TABLET ORAL at 08:06

## 2017-06-02 NOTE — PT/OT/SLP EVAL
Physical Therapy  Evaluation    Dianne Hernández   MRN: [id number]   Admitting Diagnosis: Abscess of axilla, right    PT Received On: 06/02/17  PT Start Time: 0900     PT Stop Time: 0925    PT Total Time (min): 25 min       Billable Minutes:  Evaluation 15 and Gait [id number]    Diagnosis: Abscess of axilla, right    Past Medical History:   Diagnosis Date    Arthritis     GERD (gastroesophageal reflux disease)     Hypertension       Past Surgical History:   Procedure Laterality Date    COLONOSCOPY       Referring physician: DR. DOBBINS  Date referred to PT: 6-1-17    General Precautions: Standard  Orthopedic Precautions: N/A   Braces: N/A       Patient History:  Lives With: spouse (Simultaneous filing. User may not have seen previous data.)  Living Arrangements: house  Home Accessibility: stairs to enter home  Home Layout: Able to live on 1st floor  Number of Stairs to Enter Home: 5  Stair Railings at Home: outside, present on right side  Transportation Available: car, family or friend will provide (Simultaneous filing. User may not have seen previous data.)  Living Environment Comment: PT LIVES WITH  WHO WORKS SO PT ALONE DURING THE DAY, PT FUNCTIONALLY INDEP PTA  Equipment Currently Used at Home: none (Simultaneous filing. User may not have seen previous data.)  DME owned (not currently used): none    Previous Level of Function:  Ambulation Skills: independent  Transfer Skills: independent  ADL Skills: independent  Work/Leisure Activity: independent (STILL WORKS FULL TIME)    Subjective:  Communicated with NURSE GERMAN prior to session.  Pain/Comfort  Pain Rating 1: 7/10  Location - Side 1: Bilateral  Location - Orientation 1: upper  Location 1: axilla    Objective:   Patient found with: peripheral IV     Cognitive Exam:  Oriented to: Person, Place, Time and Situation    Follows Commands/attention: Follows two-step commands  Communication: clear/fluent  Safety awareness/insight to disability:  intact    Physical Exam:  Postural examination/scapula alignment: No postural abnormalities identified    Skin integrity: Visible skin intact  Edema: None noted     Sensation:   Impaired  light/touch BOTTOM OF BOTH FEET    Lower Extremity Range of Motion:  Right Lower Extremity: WFL  Left Lower Extremity: WFL    Lower Extremity Strength:  Right Lower Extremity: GROSSLY 4/5  Left Lower Extremity: GROSSLY 4/5     Fine motor coordination:  Intact    Gross motor coordination: WFL    Functional Mobility:  Bed Mobility:  Rolling/Turning to Left: Independent  Rolling/Turning Right: Independent  Scooting/Bridging: Independent  Supine to Sit: Independent  Sit to Supine: Independent    Transfers:  Sit <> Stand Assistance: Modified Independent  Sit <> Stand Assistive Device: No Assistive Device  Bed <> Chair Technique: Stand Pivot  Bed <> Chair Assistance: Modified Independent  Bed <> Chair Assistive Device: No Assistive Device    Gait:   Gait Distance: PT ' CARLOS, NO LOB OR SOB ON ROOM AIR  Assistance 1: Modified Independent  Gait Assistive Device: No device  Gait Pattern: swing-through gait    Stairs:  Pt ascended/descend 5 stair(s) with No Assistive Device with right with Supervision or Set-up Assistance.     Balance:   Static Sit: GOOD  Dynamic Sit: GOOD  Static Stand: GOOD  Dynamic stand: GOOD    Therapeutic Activities and Exercises:  PT SHADI NOEL WITH SET UP    AM-PAC 6 CLICK MOBILITY  How much help from another person does this patient currently need?   1 = Unable, Total/Dependent Assistance  2 = A lot, Maximum/Moderate Assistance  3 = A little, Minimum/Contact Guard/Supervision  4 = None, Modified [place]/Independent    Turning over in bed (including adjusting bedclothes, sheets and blankets)?: 4  Sitting down on and standing up from a chair with arms (e.g., wheelchair, bedside commode, etc.): 4  Moving from lying on back to sitting on the side of the bed?: 4  Moving to and from a bed to a chair (including  a wheelchair)?: 4  Need to walk in hospital room?: 4  Climbing 3-5 steps with a railing?: 4  Total Score: 24     AM-PAC Raw Score CMS G-Code Modifier Level of Impairment Assistance   6 % Total / Unable   7 - 9 CM 80 - 100% Maximal Assist   10 - 14 CL 60 - 80% Moderate Assist   15 - 19 CK 40 - 60% Moderate Assist   20 - 22 CJ 20 - 40% Minimal Assist   23 CI 1-20% SBA / CGA   24 CH 0% Independent/ Mod I     Patient left up in chair with all lines intact, call button in reach and NURSE notified.    Assessment:   Dianne Hernández is a 56 y.o. female with a medical diagnosis of Abscess of axilla, right   PT NOT APPROPRIATE FOR INPATIENT SKILLED P.T. DUE TO INDEP LEVEL OF FUNCTION, PT WILL BENEFIT FROM [facility] FOR CONT. GAIT/TE    Rehab identified problem list/impairments:     Rehab potential is .    Activity tolerance:     Discharge recommendations: Discharge Facility/Level Of Care Needs: home     Barriers to discharge: Barriers to Discharge: None    Equipment recommendations: Equipment Needed After Discharge: none     GOALS:    Physical Therapy Goals     Not on file                PLAN:      (D/C PT FROM [facility]. [facility] TO [facility])  Plan of Care reviewed with: patient    PT ENCOURAGED TO CALL FOR ASSISTANCE WITH ALL NEEDS, PT AGREEABLE    Wendy Domínguez, PT  06/02/2017

## 2017-06-02 NOTE — PLAN OF CARE
Problem: Patient Care Overview  Goal: Plan of Care Review  Outcome: Ongoing (interventions implemented as appropriate)  Remains free from harm, pain controlled via PO pain meds, no acute distress noted. 24 hour chart check complete.

## 2017-06-02 NOTE — ANESTHESIA PREPROCEDURE EVALUATION
06/02/2017  Dianne Hernández is a 56 y.o., female.    Anesthesia Evaluation    I have reviewed the Patient Summary Reports.        Review of Systems  Anesthesia Hx:  Denies Family Hx of Anesthesia complications.   Denies Personal Hx of Anesthesia complications.   Cardiovascular:   Hypertension Denies MI.  Denies CAD.     Denies Angina.    Pulmonary:   Denies COPD.    Hepatic/GI:   GERD    Neurological:   Denies CVA. Denies Seizures.    Endocrine:   Denies Diabetes.        Physical Exam  General:  Well nourished    Airway/Jaw/Neck:  Airway Findings: Mouth Opening: Normal General Airway Assessment: Adult  Mallampati: II       Chest/Lungs:  Chest/Lungs Findings: Clear to auscultation, Normal Respiratory Rate     Heart/Vascular:  Heart Findings: Rate: Normal  Rhythm: Regular Rhythm             Anesthesia Plan  Type of Anesthesia, risks & benefits discussed:  Anesthesia Type:  general  Patient's Preference:   Intra-op Monitoring Plan:   Intra-op Monitoring Plan Comments:   Post Op Pain Control Plan:   Post Op Pain Control Plan Comments:   Induction:    Beta Blocker:         Informed Consent:    ASA Score: 2     Day of Surgery Review of History & Physical:            Ready For Surgery From Anesthesia Perspective.

## 2017-06-02 NOTE — PLAN OF CARE
Problem: Patient Care Overview  Goal: Plan of Care Review  Outcome: Ongoing (interventions implemented as appropriate)  Pt ambulated in [place]iles and in rm.  Free from falls  Reviewed plan of care with patient.  Patient verbalized understanding and teachback.      12h chart check completed.

## 2017-06-02 NOTE — ASSESSMENT & PLAN NOTE
S/p I&D of right axilla abscess POD 1 - doing well. Afebrile. Pain partially controlled. Will plan for additional debridement tomorrow.

## 2017-06-02 NOTE — PLAN OF CARE
Problem: Patient Care Overview  Goal: Plan of Care Review  Outcome: Ongoing (interventions implemented as appropriate)  Pt refuses offered pain medication, educated on importance of not letting pain become too severe before asking for medications.  SCDs in place.  Family at bedside. PT free from falls.  Reviewed plan of care with patient.  Patient verbalized understanding and teachback.      12h chart check completed.

## 2017-06-02 NOTE — PLAN OF CARE
Met with patient. She lives with her  and works full time. No anticipated discharge.   Provided discharge planning brochure with contact information.      06/02/17 6895   Discharge Assessment   Assessment Type Discharge Planning Assessment   Confirmed/corrected address and phone number on facesheet? Yes   Assessment information obtained from? Patient;Medical Record   Expected Length of Stay (days) (2)   Communicated expected length of stay with patient/caregiver yes   Prior to hospitilization cognitive status: Alert/Oriented   Prior to hospitalization functional status: Independent   Current cognitive status: Alert/Oriented   Current Functional Status: Independent   Arrived From home or self-care   Lives With spouse  (Simultaneous filing. User may not have seen previous data.)   Able to Return to Prior Arrangements yes   Is patient able to care for self after discharge? Yes   Patient's perception of discharge disposition home or selfcare   Readmission Within The Last 30 Days no previous admission in last 30 days   Patient currently being followed by outpatient case management? No   Patient currently receives home health services? No   Does the patient currently use HME? No   Equipment Currently Used at Home none  (Simultaneous filing. User may not have seen previous data.)   Do you have any problems affording any of your prescribed medications? No   Is the patient taking medications as prescribed? yes   Do you have any financial concerns preventing you from receiving the healthcare you need? No   Does the patient have transportation to healthcare appointments? Yes   Transportation Available car;family or friend will provide  (Simultaneous filing. User may not have seen previous data.)   On Dialysis? No   Discharge Plan A Home   Discharge Plan B Home with family   Patient/Family In Agreement With Plan yes

## 2017-06-03 ENCOUNTER — SURGERY (OUTPATIENT)
Age: 57
End: 2017-06-03

## 2017-06-03 ENCOUNTER — ANESTHESIA (OUTPATIENT)
Dept: SURGERY | Facility: HOSPITAL | Age: 57
DRG: 572 | End: 2017-06-03
Payer: COMMERCIAL

## 2017-06-03 PROCEDURE — 0JDD0ZZ EXTRACTION OF RIGHT UPPER ARM SUBCUTANEOUS TISSUE AND FASCIA, OPEN APPROACH: ICD-10-PCS | Performed by: SURGERY

## 2017-06-03 PROCEDURE — 36000706: Performed by: SURGERY

## 2017-06-03 PROCEDURE — 96372 THER/PROPH/DIAG INJ SC/IM: CPT

## 2017-06-03 PROCEDURE — 11043 DBRDMT MUSC&/FSCA 1ST 20/<: CPT | Mod: 58,,, | Performed by: SURGERY

## 2017-06-03 PROCEDURE — 25000003 PHARM REV CODE 250: Performed by: SURGERY

## 2017-06-03 PROCEDURE — 11000001 HC ACUTE MED/SURG PRIVATE ROOM

## 2017-06-03 PROCEDURE — 25000003 PHARM REV CODE 250: Performed by: NURSE ANESTHETIST, CERTIFIED REGISTERED

## 2017-06-03 PROCEDURE — 37000008 HC ANESTHESIA 1ST 15 MINUTES: Performed by: SURGERY

## 2017-06-03 PROCEDURE — 63600175 PHARM REV CODE 636 W HCPCS: Performed by: SURGERY

## 2017-06-03 PROCEDURE — 94799 UNLISTED PULMONARY SVC/PX: CPT

## 2017-06-03 PROCEDURE — 37000009 HC ANESTHESIA EA ADD 15 MINS: Performed by: SURGERY

## 2017-06-03 PROCEDURE — 71000033 HC RECOVERY, INTIAL HOUR: Performed by: SURGERY

## 2017-06-03 PROCEDURE — 99024 POSTOP FOLLOW-UP VISIT: CPT | Mod: ,,, | Performed by: SURGERY

## 2017-06-03 PROCEDURE — 63600175 PHARM REV CODE 636 W HCPCS: Performed by: NURSE ANESTHETIST, CERTIFIED REGISTERED

## 2017-06-03 PROCEDURE — 36000707: Performed by: SURGERY

## 2017-06-03 PROCEDURE — 63600175 PHARM REV CODE 636 W HCPCS: Performed by: ANESTHESIOLOGY

## 2017-06-03 PROCEDURE — 11046 DBRDMT MUSC&/FSCA EA ADDL: CPT | Mod: ,,, | Performed by: SURGERY

## 2017-06-03 RX ORDER — PROMETHAZINE HYDROCHLORIDE 25 MG/1
25 TABLET ORAL EVERY 6 HOURS PRN
Status: DISCONTINUED | OUTPATIENT
Start: 2017-06-03 | End: 2017-06-04 | Stop reason: HOSPADM

## 2017-06-03 RX ORDER — SODIUM CHLORIDE 9 MG/ML
3 INJECTION, SOLUTION INTRAMUSCULAR; INTRAVENOUS; SUBCUTANEOUS
Status: DISCONTINUED | OUTPATIENT
Start: 2017-06-03 | End: 2017-06-04 | Stop reason: HOSPADM

## 2017-06-03 RX ORDER — MORPHINE SULFATE 10 MG/ML
2 INJECTION INTRAMUSCULAR; INTRAVENOUS; SUBCUTANEOUS EVERY 5 MIN PRN
Status: DISCONTINUED | OUTPATIENT
Start: 2017-06-03 | End: 2017-06-04 | Stop reason: HOSPADM

## 2017-06-03 RX ORDER — MIDAZOLAM HYDROCHLORIDE 1 MG/ML
INJECTION INTRAMUSCULAR; INTRAVENOUS
Status: DISCONTINUED | OUTPATIENT
Start: 2017-06-03 | End: 2017-06-03

## 2017-06-03 RX ORDER — HYDROCODONE BITARTRATE AND ACETAMINOPHEN 5; 325 MG/1; MG/1
1 TABLET ORAL EVERY 4 HOURS PRN
Status: DISCONTINUED | OUTPATIENT
Start: 2017-06-03 | End: 2017-06-04 | Stop reason: HOSPADM

## 2017-06-03 RX ORDER — PROPOFOL 10 MG/ML
VIAL (ML) INTRAVENOUS
Status: DISCONTINUED | OUTPATIENT
Start: 2017-06-03 | End: 2017-06-03

## 2017-06-03 RX ORDER — ONDANSETRON 2 MG/ML
4 INJECTION INTRAMUSCULAR; INTRAVENOUS DAILY PRN
Status: DISCONTINUED | OUTPATIENT
Start: 2017-06-03 | End: 2017-06-04 | Stop reason: HOSPADM

## 2017-06-03 RX ORDER — ACETAMINOPHEN 325 MG/1
650 TABLET ORAL EVERY 6 HOURS PRN
Status: DISCONTINUED | OUTPATIENT
Start: 2017-06-03 | End: 2017-06-04 | Stop reason: HOSPADM

## 2017-06-03 RX ORDER — OXYCODONE HYDROCHLORIDE 5 MG/1
5 TABLET ORAL
Status: DISCONTINUED | OUTPATIENT
Start: 2017-06-03 | End: 2017-06-04 | Stop reason: HOSPADM

## 2017-06-03 RX ORDER — RAMELTEON 8 MG/1
8 TABLET ORAL NIGHTLY PRN
Status: DISCONTINUED | OUTPATIENT
Start: 2017-06-03 | End: 2017-06-04 | Stop reason: HOSPADM

## 2017-06-03 RX ORDER — SODIUM CHLORIDE, SODIUM LACTATE, POTASSIUM CHLORIDE, CALCIUM CHLORIDE 600; 310; 30; 20 MG/100ML; MG/100ML; MG/100ML; MG/100ML
INJECTION, SOLUTION INTRAVENOUS CONTINUOUS
Status: DISCONTINUED | OUTPATIENT
Start: 2017-06-03 | End: 2017-06-04 | Stop reason: HOSPADM

## 2017-06-03 RX ORDER — ENOXAPARIN SODIUM 100 MG/ML
40 INJECTION SUBCUTANEOUS
Status: DISCONTINUED | OUTPATIENT
Start: 2017-06-03 | End: 2017-06-04 | Stop reason: HOSPADM

## 2017-06-03 RX ORDER — BISACODYL 10 MG
10 SUPPOSITORY, RECTAL RECTAL DAILY PRN
Status: DISCONTINUED | OUTPATIENT
Start: 2017-06-03 | End: 2017-06-04 | Stop reason: HOSPADM

## 2017-06-03 RX ORDER — LACTULOSE 10 G/15ML
20 SOLUTION ORAL EVERY 6 HOURS PRN
Status: DISCONTINUED | OUTPATIENT
Start: 2017-06-03 | End: 2017-06-04 | Stop reason: HOSPADM

## 2017-06-03 RX ORDER — HYDROMORPHONE HYDROCHLORIDE 2 MG/ML
0.2 INJECTION, SOLUTION INTRAMUSCULAR; INTRAVENOUS; SUBCUTANEOUS EVERY 5 MIN PRN
Status: DISCONTINUED | OUTPATIENT
Start: 2017-06-03 | End: 2017-06-04 | Stop reason: HOSPADM

## 2017-06-03 RX ORDER — SUCCINYLCHOLINE CHLORIDE 20 MG/ML
INJECTION INTRAMUSCULAR; INTRAVENOUS
Status: DISCONTINUED | OUTPATIENT
Start: 2017-06-03 | End: 2017-06-03

## 2017-06-03 RX ORDER — SODIUM CHLORIDE, SODIUM LACTATE, POTASSIUM CHLORIDE, CALCIUM CHLORIDE 600; 310; 30; 20 MG/100ML; MG/100ML; MG/100ML; MG/100ML
INJECTION, SOLUTION INTRAVENOUS CONTINUOUS PRN
Status: DISCONTINUED | OUTPATIENT
Start: 2017-06-03 | End: 2017-06-03

## 2017-06-03 RX ORDER — ROCURONIUM BROMIDE 10 MG/ML
INJECTION, SOLUTION INTRAVENOUS
Status: DISCONTINUED | OUTPATIENT
Start: 2017-06-03 | End: 2017-06-03

## 2017-06-03 RX ORDER — DIPHENHYDRAMINE HYDROCHLORIDE 50 MG/ML
25 INJECTION INTRAMUSCULAR; INTRAVENOUS EVERY 4 HOURS PRN
Status: DISCONTINUED | OUTPATIENT
Start: 2017-06-03 | End: 2017-06-04 | Stop reason: HOSPADM

## 2017-06-03 RX ORDER — AMOXICILLIN 250 MG
1 CAPSULE ORAL 2 TIMES DAILY
Status: DISCONTINUED | OUTPATIENT
Start: 2017-06-03 | End: 2017-06-04 | Stop reason: HOSPADM

## 2017-06-03 RX ORDER — MORPHINE SULFATE 2 MG/ML
2 INJECTION, SOLUTION INTRAMUSCULAR; INTRAVENOUS
Status: DISCONTINUED | OUTPATIENT
Start: 2017-06-03 | End: 2017-06-04 | Stop reason: HOSPADM

## 2017-06-03 RX ORDER — FENTANYL CITRATE 50 UG/ML
INJECTION, SOLUTION INTRAMUSCULAR; INTRAVENOUS
Status: DISCONTINUED | OUTPATIENT
Start: 2017-06-03 | End: 2017-06-03

## 2017-06-03 RX ORDER — ONDANSETRON 8 MG/1
8 TABLET, ORALLY DISINTEGRATING ORAL EVERY 8 HOURS PRN
Status: DISCONTINUED | OUTPATIENT
Start: 2017-06-03 | End: 2017-06-04 | Stop reason: HOSPADM

## 2017-06-03 RX ORDER — LIDOCAINE HCL/PF 100 MG/5ML
SYRINGE (ML) INTRAVENOUS
Status: DISCONTINUED | OUTPATIENT
Start: 2017-06-03 | End: 2017-06-03

## 2017-06-03 RX ADMIN — MIDAZOLAM HYDROCHLORIDE 2 MG: 1 INJECTION, SOLUTION INTRAMUSCULAR; INTRAVENOUS at 09:06

## 2017-06-03 RX ADMIN — SUCCINYLCHOLINE CHLORIDE 200 MG: 20 INJECTION, SOLUTION INTRAMUSCULAR; INTRAVENOUS at 09:06

## 2017-06-03 RX ADMIN — METOPROLOL SUCCINATE 100 MG: 50 TABLET, EXTENDED RELEASE ORAL at 08:06

## 2017-06-03 RX ADMIN — LIDOCAINE HYDROCHLORIDE 100 MG: 20 INJECTION, SOLUTION INTRAVENOUS at 09:06

## 2017-06-03 RX ADMIN — SODIUM CHLORIDE, SODIUM LACTATE, POTASSIUM CHLORIDE, AND CALCIUM CHLORIDE: .6; .31; .03; .02 INJECTION, SOLUTION INTRAVENOUS at 06:06

## 2017-06-03 RX ADMIN — CLINDAMYCIN IN 5 PERCENT DEXTROSE 900 MG: 18 INJECTION, SOLUTION INTRAVENOUS at 08:06

## 2017-06-03 RX ADMIN — STANDARDIZED SENNA CONCENTRATE AND DOCUSATE SODIUM 1 TABLET: 8.6; 5 TABLET, FILM COATED ORAL at 12:06

## 2017-06-03 RX ADMIN — FENTANYL CITRATE 50 MCG: 50 INJECTION, SOLUTION INTRAMUSCULAR; INTRAVENOUS at 09:06

## 2017-06-03 RX ADMIN — METRONIDAZOLE 500 MG: 500 INJECTION, SOLUTION INTRAVENOUS at 05:06

## 2017-06-03 RX ADMIN — CLINDAMYCIN IN 5 PERCENT DEXTROSE 900 MG: 18 INJECTION, SOLUTION INTRAVENOUS at 04:06

## 2017-06-03 RX ADMIN — METRONIDAZOLE 500 MG: 500 INJECTION, SOLUTION INTRAVENOUS at 09:06

## 2017-06-03 RX ADMIN — ROCURONIUM BROMIDE 5 MG: 10 INJECTION, SOLUTION INTRAVENOUS at 09:06

## 2017-06-03 RX ADMIN — ENOXAPARIN SODIUM 40 MG: 100 INJECTION SUBCUTANEOUS at 04:06

## 2017-06-03 RX ADMIN — POTASSIUM CHLORIDE 20 MEQ: 1500 TABLET, EXTENDED RELEASE ORAL at 08:06

## 2017-06-03 RX ADMIN — SODIUM CHLORIDE, SODIUM LACTATE, POTASSIUM CHLORIDE, AND CALCIUM CHLORIDE: 600; 310; 30; 20 INJECTION, SOLUTION INTRAVENOUS at 09:06

## 2017-06-03 RX ADMIN — METRONIDAZOLE 500 MG: 500 INJECTION, SOLUTION INTRAVENOUS at 12:06

## 2017-06-03 RX ADMIN — CLINDAMYCIN IN 5 PERCENT DEXTROSE 900 MG: 18 INJECTION, SOLUTION INTRAVENOUS at 12:06

## 2017-06-03 RX ADMIN — PROPOFOL 200 MG: 10 INJECTION, EMULSION INTRAVENOUS at 09:06

## 2017-06-03 RX ADMIN — SODIUM CHLORIDE, SODIUM LACTATE, POTASSIUM CHLORIDE, AND CALCIUM CHLORIDE: .6; .31; .03; .02 INJECTION, SOLUTION INTRAVENOUS at 01:06

## 2017-06-03 RX ADMIN — STANDARDIZED SENNA CONCENTRATE AND DOCUSATE SODIUM 1 TABLET: 8.6; 5 TABLET, FILM COATED ORAL at 08:06

## 2017-06-03 RX ADMIN — ONDANSETRON 4 MG: 2 INJECTION INTRAMUSCULAR; INTRAVENOUS at 11:06

## 2017-06-03 RX ADMIN — DOXAZOSIN MESYLATE 2 MG: 2 TABLET ORAL at 08:06

## 2017-06-03 RX ADMIN — HYDROMORPHONE HYDROCHLORIDE 0.2 MG: 2 INJECTION, SOLUTION INTRAMUSCULAR; INTRAVENOUS; SUBCUTANEOUS at 10:06

## 2017-06-03 NOTE — TRANSFER OF CARE
"Anesthesia Transfer of Care Note    Patient: Dianne Hernández    Procedure(s) Performed: Procedure(s) (LRB):  INCISION AND DRAINAGE (I&D), ABSCESS (Right)    Patient location: PACU    Anesthesia Type: general    Transport from OR: Transported from OR on room air with adequate spontaneous ventilation    Post pain: adequate analgesia    Post assessment: no apparent anesthetic complications and tolerated procedure well    Post vital signs: stable    Level of consciousness: awake    Nausea/Vomiting: no nausea/vomiting    Complications: none    Transfer of care protocol was followed      Last vitals:   Visit Vitals  BP (!) 154/71 (BP Location: Right arm, Patient Position: Lying, BP Method: Automatic)   Pulse 63   Temp 36.5 °C (97.7 °F) (Temporal)   Resp 12   Ht 5' 5.5" (1.664 m)   Wt 108.5 kg (239 lb 3.2 oz)   SpO2 100%   Breastfeeding? No   BMI 39.20 kg/m²     "

## 2017-06-03 NOTE — PLAN OF CARE
Problem: Patient Care Overview  Goal: Plan of Care Review  Outcome: Ongoing (interventions implemented as appropriate)  Patient AAO. Heart rate in 50's and bp between 90's-110/57-67.  Held 2100 Cardura dosage due to low BP. Encouraged patient to reposition in bed every 2 hours.  Remains free of injury. Fall precautions maintained with bed low and wheels locked. Patient kept NPO after midnight for procedure on 6/3.   IVF administered as ordered. Pain mildly managed with prn oral meds. Pt. reluctant to try IV morphine after she became NPO.  Offered warm compress.  Dressing to Right arm pit with copious amounts of d/c.  Reinforced at beginning of shift, but due to bulkiness causing additional discomfort, pt. Requested that it be removed. Chart review for 24 hours completed. Will continue to monitor till discharge.

## 2017-06-03 NOTE — PROGRESS NOTES
General Surgery Progress Note    SUBJECTIVE:    C/o pain at surgical site    OBJECTIVE:    Vital signs and intake/output reviewed.     Physical Exam:   General: no distress     Labs and images reviewed.    ASSESSMENT/PLAN:    Active Hospital Problems    Diagnosis  POA    *Abscess of axilla, right [L02.411]  Yes      Resolved Hospital Problems    Diagnosis Date Resolved POA   No resolved problems to display.        To OR today for re-debridement

## 2017-06-03 NOTE — ANESTHESIA RELEASE NOTE
"Anesthesia Release from PACU Note    Patient: Dianne Hernández    Procedure(s) Performed: Procedure(s) (LRB):  INCISION AND DRAINAGE (I&D), ABSCESS (Right)    Anesthesia type: general    Post pain: Adequate analgesia    Post assessment: no apparent anesthetic complications, tolerated procedure well and no evidence of recall    Last Vitals:   Visit Vitals  BP (!) 154/71 (BP Location: Right arm, Patient Position: Lying, BP Method: Automatic)   Pulse 63   Temp 36.5 °C (97.7 °F) (Temporal)   Resp 12   Ht 5' 5.5" (1.664 m)   Wt 108.5 kg (239 lb 3.2 oz)   SpO2 100%   Breastfeeding? No   BMI 39.20 kg/m²       Post vital signs: stable    Level of consciousness: awake, alert  and oriented    Nausea/Vomiting: no nausea/no vomiting    Complications: none    Airway Patency: patent    Respiratory: unassisted, spontaneous ventilation, room air    Cardiovascular: stable and blood pressure at baseline    Hydration: euvolemic  "

## 2017-06-03 NOTE — PLAN OF CARE
Problem: Patient Care Overview  Goal: Plan of Care Review  Outcome: Ongoing (interventions implemented as appropriate)  Pt remain free from fall and injuries this shift personal belonging within reach bed lock call light within reach. Pain managed by prn pain med as ordered. Pt on clear liquid tolerate well no nausea or vomiting noted at this time. 12 hr chart check complete. Clean dry dressing to right axilla intact. Spouse at bedside.

## 2017-06-03 NOTE — PT/OT/SLP PROGRESS
Physical Therapy      Dianne Hernández  MRN: [id number]    Patient not seen today secondary to Patient ill (Comment) (Pt very nuaseated.  will retry tomorrow). Will follow-up tomorrow  .    Migel Menjivar, PT

## 2017-06-03 NOTE — ANESTHESIA POSTPROCEDURE EVALUATION
"Anesthesia Post Evaluation    Patient: Dianne Hernández    Procedure(s) Performed: Procedure(s) (LRB):  INCISION AND DRAINAGE (I&D), ABSCESS (Right)    Final Anesthesia Type: general  Patient location during evaluation: PACU  Patient participation: Yes- Able to Participate  Level of consciousness: awake and alert  Post-procedure vital signs: reviewed and stable  Pain management: adequate  Airway patency: patent  PONV status at discharge: No PONV  Anesthetic complications: no      Cardiovascular status: blood pressure returned to baseline  Respiratory status: unassisted  Hydration status: euvolemic  Follow-up not needed.        Visit Vitals  BP (!) 105/57 (BP Location: Right arm, Patient Position: Lying, BP Method: Automatic)   Pulse (!) 52   Temp 36.7 °C (98 °F) (Oral)   Resp 18   Ht 5' 5.5" (1.664 m)   Wt 108.5 kg (239 lb 3.2 oz)   SpO2 97%   Breastfeeding? No   BMI 39.20 kg/m²       Pain/Abhay Score: Pain Assessment Performed: Yes (6/3/2017 10:43 AM)  Presence of Pain: complains of pain/discomfort (6/3/2017 10:43 AM)  Pain Rating Prior to Med Admin: 7 (6/3/2017 10:25 AM)  Pain Rating Post Med Admin: 4 (6/2/2017  9:54 PM)  Abhay Score: 9 (6/3/2017 10:30 AM)      "

## 2017-06-03 NOTE — OP NOTE
Operative Note       SURGERY DATE:  6/3/2017     PRE-OP DIAGNOSIS:  Abscess of axilla, right [L02.411]    POST-OP DIAGNOSIS:  Abscess of axilla, right [L02.411]    Procedure(s) (LRB):  Debridement of right axillary wound    Surgeon(s) and Role:     * Louis O. Jeansonne IV, MD - Primary    ASSISTANT:  None    TASKS PERFORMED BY ASSISTANT:  No assistant    ANESTHESIA: General    FINDINGS: Small amount of necrotic tissue    GRAFTS/IMPLANTS:  None    ESTIMATED BLOOD LOSS: 5 mL              COMPLICATIONS:  None    SPECIMEN:  None    INDICATION:  Axillary abscess    DESCRIPTION OF PROCEDURE:    The patient was placed under general anesthesia.  Pre-op antibiotics were given within an hour before the incision and SCD's were placed for DVT prophylaxis.  The right axilla was prepped and draped in sterile fashion.      There was already an open wound from the previous debridement.  Small areas of necrotic tissue were excised with cautery.  The wound was dressed with sterile gauze.    The patient tolerated the procedure well and was transported to the PACU in stable condition.    Debridement:                *Site: right axilla                            *Depth of tissue excised:  Soft Tissue                *Instrument Used:  Cautery                *Size of wound after debridement : 10x5 cm            CONDITION: Good    DISPOSITION: PACU - hemodynamically stable.

## 2017-06-04 VITALS
SYSTOLIC BLOOD PRESSURE: 129 MMHG | TEMPERATURE: 98 F | HEIGHT: 66 IN | BODY MASS INDEX: 38.44 KG/M2 | OXYGEN SATURATION: 97 % | WEIGHT: 239.19 LBS | HEART RATE: 57 BPM | RESPIRATION RATE: 18 BRPM | DIASTOLIC BLOOD PRESSURE: 77 MMHG

## 2017-06-04 LAB
ANION GAP SERPL CALC-SCNC: 7 MMOL/L
BASOPHILS # BLD AUTO: 0.02 K/UL
BASOPHILS NFR BLD: 0.3 %
BUN SERPL-MCNC: 12 MG/DL
CALCIUM SERPL-MCNC: 9.5 MG/DL
CHLORIDE SERPL-SCNC: 107 MMOL/L
CO2 SERPL-SCNC: 27 MMOL/L
CREAT SERPL-MCNC: 0.9 MG/DL
DIFFERENTIAL METHOD: NORMAL
EOSINOPHIL # BLD AUTO: 0.4 K/UL
EOSINOPHIL NFR BLD: 6.1 %
ERYTHROCYTE [DISTWIDTH] IN BLOOD BY AUTOMATED COUNT: 14.3 %
EST. GFR  (AFRICAN AMERICAN): >60 ML/MIN/1.73 M^2
EST. GFR  (NON AFRICAN AMERICAN): >60 ML/MIN/1.73 M^2
GLUCOSE SERPL-MCNC: 106 MG/DL
HCT VFR BLD AUTO: 38.2 %
HGB BLD-MCNC: 12.7 G/DL
LYMPHOCYTES # BLD AUTO: 2.2 K/UL
LYMPHOCYTES NFR BLD: 30 %
MCH RBC QN AUTO: 30 PG
MCHC RBC AUTO-ENTMCNC: 33.2 %
MCV RBC AUTO: 90 FL
MONOCYTES # BLD AUTO: 0.5 K/UL
MONOCYTES NFR BLD: 7 %
NEUTROPHILS # BLD AUTO: 4.1 K/UL
NEUTROPHILS NFR BLD: 56.6 %
PLATELET # BLD AUTO: 220 K/UL
PMV BLD AUTO: 11.5 FL
POTASSIUM SERPL-SCNC: 4 MMOL/L
RBC # BLD AUTO: 4.23 M/UL
SODIUM SERPL-SCNC: 141 MMOL/L
WBC # BLD AUTO: 7.16 K/UL

## 2017-06-04 PROCEDURE — G8980 MOBILITY D/C STATUS: HCPCS | Mod: CH

## 2017-06-04 PROCEDURE — G8978 MOBILITY CURRENT STATUS: HCPCS | Mod: CH

## 2017-06-04 PROCEDURE — 80048 BASIC METABOLIC PNL TOTAL CA: CPT

## 2017-06-04 PROCEDURE — 99024 POSTOP FOLLOW-UP VISIT: CPT | Mod: ,,, | Performed by: SURGERY

## 2017-06-04 PROCEDURE — 25000003 PHARM REV CODE 250: Performed by: SURGERY

## 2017-06-04 PROCEDURE — 36415 COLL VENOUS BLD VENIPUNCTURE: CPT

## 2017-06-04 PROCEDURE — 85025 COMPLETE CBC W/AUTO DIFF WBC: CPT

## 2017-06-04 PROCEDURE — 97161 PT EVAL LOW COMPLEX 20 MIN: CPT

## 2017-06-04 PROCEDURE — G8979 MOBILITY GOAL STATUS: HCPCS | Mod: CH

## 2017-06-04 PROCEDURE — 97116 GAIT TRAINING THERAPY: CPT

## 2017-06-04 RX ORDER — CLINDAMYCIN HYDROCHLORIDE 300 MG/1
300 CAPSULE ORAL EVERY 6 HOURS
Qty: 40 CAPSULE | Refills: 0 | Status: SHIPPED | OUTPATIENT
Start: 2017-06-04 | End: 2017-06-15 | Stop reason: SDUPTHER

## 2017-06-04 RX ORDER — HYDROCODONE BITARTRATE AND ACETAMINOPHEN 5; 325 MG/1; MG/1
1 TABLET ORAL EVERY 4 HOURS PRN
Qty: 40 TABLET | Refills: 0 | Status: SHIPPED | OUTPATIENT
Start: 2017-06-04 | End: 2017-06-22 | Stop reason: ALTCHOICE

## 2017-06-04 RX ADMIN — METRONIDAZOLE 500 MG: 500 INJECTION, SOLUTION INTRAVENOUS at 12:06

## 2017-06-04 RX ADMIN — METRONIDAZOLE 500 MG: 500 INJECTION, SOLUTION INTRAVENOUS at 09:06

## 2017-06-04 RX ADMIN — CLINDAMYCIN IN 5 PERCENT DEXTROSE 900 MG: 18 INJECTION, SOLUTION INTRAVENOUS at 08:06

## 2017-06-04 RX ADMIN — STANDARDIZED SENNA CONCENTRATE AND DOCUSATE SODIUM 1 TABLET: 8.6; 5 TABLET, FILM COATED ORAL at 08:06

## 2017-06-04 RX ADMIN — POTASSIUM CHLORIDE 20 MEQ: 1500 TABLET, EXTENDED RELEASE ORAL at 08:06

## 2017-06-04 RX ADMIN — METOPROLOL SUCCINATE 100 MG: 50 TABLET, EXTENDED RELEASE ORAL at 08:06

## 2017-06-04 RX ADMIN — SODIUM CHLORIDE, SODIUM LACTATE, POTASSIUM CHLORIDE, AND CALCIUM CHLORIDE: .6; .31; .03; .02 INJECTION, SOLUTION INTRAVENOUS at 12:06

## 2017-06-04 RX ADMIN — HYDROCODONE BITARTRATE AND ACETAMINOPHEN 1 TABLET: 5; 325 TABLET ORAL at 12:06

## 2017-06-04 RX ADMIN — CLINDAMYCIN IN 5 PERCENT DEXTROSE 900 MG: 18 INJECTION, SOLUTION INTRAVENOUS at 12:06

## 2017-06-04 NOTE — PLAN OF CARE
06/04/17 1141   Final Note   Assessment Type Final Discharge Note   Discharge Disposition Home   What phone number can be called within the next 1-3 days to see how you are doing after discharge? [phone]   Right Care Referral Info   Post Acute Recommendation No Care   Kelsie Maldonado, J LUIS, ACBLANCA-SW, [facility]  06/04/2017

## 2017-06-04 NOTE — DISCHARGE SUMMARY
[facility]  General Surgery  Discharge Summary      Patient Name: Dianne Hernández  MRN: [id number]  Admission Date: 6/1/2017  Hospital Length of Stay: 3 days  Discharge Date and Time:  06/04/2017 10:52 AM  Attending Physician: Jessee Hopper MD   Discharging Provider: Louis O. Jeansonne, MD  Primary Care Provider: Jamie Ferris MD    HPI:   Dianne Hernández presented to the [facility] ED today with complaints of a right axillary abscess that began about 3 weeks ago. Sx gradually worsened. She now has severe 7/10 pain, redness, and swelling of right axilla. She denies drainage from the area, fever, chills.     Procedure(s) (LRB):  INCISION AND DRAINAGE (I&D), ABSCESS (Right)      Indwelling Lines/Drains at time of discharge:   Lines/Drains/Airways          No matching active lines, drains, or airways        Hospital Course: The patient was admitted for I&D 06/01/2017  She underwent debridement of right axillary abscess.  Stable for discharge on 6/4/17.    Consults:   Consults         Status Ordering Provider     Consult to Case Management/Social Work  Once     Provider:  (Not yet assigned)    Completed JEANSONNE, LOUIS O. IV          Significant Diagnostic Studies: Labs:   CBC   Recent Labs  Lab 06/04/17  0632   WBC 7.16   HGB 12.7   HCT 38.2          Pending Diagnostic Studies:     None        Final Active Diagnoses:    Diagnosis Date Noted POA    PRINCIPAL PROBLEM:  Abscess of axilla, right [L02.411] 06/01/2017 Yes      Problems Resolved During this Admission:    Diagnosis Date Noted Date Resolved POA      Discharged Condition: good    Disposition:     Follow Up:  Follow-up Information     Jessee Hopper MD On 6/6/2017.    Specialty:  General Surgery  Why:  Post-op, at [facility] location  Contact information:  [address] [facility] [address] [phone]                 Patient Instructions:   No discharge procedures on file.  Medications:  Reconciled Home Medications:   Current Discharge  Medication List      START taking these medications    Details   clindamycin (CLEOCIN) 300 MG capsule Take 1 capsule (300 mg total) by mouth every 6 (six) hours.  Qty: 40 capsule, Refills: 0      hydrocodone-acetaminophen 5-325mg (NORCO) 5-325 mg per tablet Take 1 tablet by mouth every 4 (four) hours as needed for Pain.  Qty: 40 tablet, Refills: 0         CONTINUE these medications which have NOT CHANGED    Details   allopurinol (ZYLOPRIM) 300 MG tablet Take 300 mg by mouth once daily.      alprazolam (XANAX) 0.5 MG tablet Take 0.5 mg by mouth 3 (three) times daily.      doxazosin (CARDURA) 2 MG tablet Take 2 mg by mouth every evening.      metoprolol succinate (TOPROL-XL) 100 MG 24 hr tablet Take 100 mg by mouth 2 (two) times daily.      olmesartan-amlodipin-hcthiazid 40-10-12.5 mg Tab Take by mouth.      omeprazole (PRILOSEC) 20 MG capsule Take 20 mg by mouth once daily.      potassium chloride SA (K-DUR,KLOR-CON) 20 MEQ tablet Take 20 mEq by mouth once daily.      tramadol (ULTRAM) 50 mg tablet Take 50 mg by mouth every 6 (six) hours as needed for Pain.      vitamin D 1000 units Tab Take 1,000 Units by mouth once daily.           Time spent on the discharge of patient: 25 minutes    Louis O. Jeansonne, MD  General Surgery  [facility]

## 2017-06-04 NOTE — PT/OT/SLP EVAL
Physical Therapy  Evaluation    Dianne Hernández   MRN: [id number]   Admitting Diagnosis: Abscess of axilla, right    PT Received On: 06/04/17  PT Start Time: 1000     PT Stop Time: 1025    PT Total Time (min): 25 min       Billable Minutes:  Evaluation 15 and Gait [id number]    Diagnosis: Abscess of axilla, right      Past Medical History:   Diagnosis Date    Arthritis     GERD (gastroesophageal reflux disease)     Hypertension       Past Surgical History:   Procedure Laterality Date    COLONOSCOPY             General Precautions: Standard,    Orthopedic Precautions: N/A   Braces:              Patient History:  Lives With: spouse  Living Arrangements: house  Home Accessibility: stairs to enter home  Home Layout: Able to live on 1st floor  Number of Stairs to Enter Home: 5  Stair Railings at Home: outside, present on right side  Transportation Available: car  Living Environment Comment: PT lives with  who works during day.  Pt functionally IND and working prior to admit  Equipment Currently Used at Home: none  DME owned (not currently used): none    Previous Level of Function:  Ambulation Skills: independent  Transfer Skills: independent  ADL Skills: independent  Work/Leisure Activity: independent    Subjective:  Communicated with nursing prior to session.    Chief Complaint: NONE   Patient goals: return to home    Pain/Comfort  Pain Rating 1: 3/10  Location - Side 1: Right  Location - Orientation 1: upper  Location 1: axilla  Pain Addressed 1: Reposition      Objective:   Patient found with: peripheral IV     Cognitive Exam:  Oriented to: Person, Place, Time and Situation    Follows Commands/attention: Follows multistep  commands  Communication: clear/fluent  Safety awareness/insight to disability: intact    Physical Exam:  Postural examination/scapula alignment: Rounded shoulder and Head forward    Skin integrity: Visible skin intact  Edema: None noted     Sensation:   Intact    Upper Extremity Range of  Motion:      Lower Extremity Range of Motion:  Right Lower Extremity: WFL  Left Lower Extremity: WFL    Lower Extremity Strength:  Right Lower Extremity: WFL  Left Lower Extremity: WFL     Fine motor coordination:  Intact    Gross motor coordination: WFL    Functional Mobility:  Bed Mobility:  Supine to Sit: Independent  Sit to Supine: Independent    Transfers:  Sit <> Stand Assistance: Independent  Sit <> Stand Assistive Device: No Assistive Device  Bed <> Chair Technique: Stand Pivot    Gait:   Gait Distance:  (Ambulated 200 feet without AD.  [facility])  Assistance 1: Independent  Gait Assistive Device: No device  Gait Pattern: swing-through gait  Gait Deviation(s): decreased toi    Stairs:  Pt ascended/descend 5 stair(s) with No Assistive Device with left and handrails with Modified Independent.     Balance:   Static Sit: GOOD: Takes MODERATE challenges from all directions  Dynamic Sit: GOOD: Maintains balance through MODERATE excursions of active trunk movement  Static Stand: GOOD: Takes MODERATE challenges from all directions  Dynamic stand: GOOD+: Independent gait (with or without assistive device)    Therapeutic Activities and Exercises:      AM-PAC 6 CLICK MOBILITY  How much help from another person does this patient currently need?   1 = Unable, Total/Dependent Assistance  2 = A lot, Maximum/Moderate Assistance  3 = A little, Minimum/Contact Guard/Supervision  4 = None, Modified [place]/Independent    Turning over in bed (including adjusting bedclothes, sheets and blankets)?: 4  Sitting down on and standing up from a chair with arms (e.g., wheelchair, bedside commode, etc.): 4  Moving from lying on back to sitting on the side of the bed?: 4  Moving to and from a bed to a chair (including a wheelchair)?: 4  Need to walk in hospital room?: 4  Climbing 3-5 steps with a railing?: 4  Total Score: 24     AM-PAC Raw Score CMS G-Code Modifier Level of Impairment  Assistance   6 % Total / Unable   7 - 9 CM 80 - 100% Maximal Assist   10 - 14 CL 60 - 80% Moderate Assist   15 - 19 CK 40 - 60% Moderate Assist   20 - 22 CJ 20 - 40% Minimal Assist   23 CI 1-20% SBA / CGA   24 CH 0% Independent/ Mod I     Patient left up in chair with all lines intact and call button in reach.    Assessment  PT demonstrated IND with gait and stair climbing. LE ROM and MMT are WFL.  Will place on people movers for ambulation during hospital stay    Rehab identified problem list/impairments: Rehab identified problem list/impairments: impaired endurance    Rehab potential is excellent.    Activity tolerance: Excellent    Discharge recommendations: Discharge Facility/Level Of Care Needs: home     Barriers to discharge: Barriers to Discharge: None    Equipment recommendations: Equipment Needed After Discharge: bath bench     GOALS:    Physical Therapy Goals        Problem: Physical Therapy Goal    Goal Priority Disciplines Outcome Goal Variances Interventions   Physical Therapy Goal     PT/OT, PT                      PLAN:    Patient to be seen   to address the above listed problems via  (D/C PT FROM P.T. SERVICES TO [facility])  Plan of Care expires:    Plan of Care reviewed with: patient    Functional Assessment Tool Used: FIMS  Score: 7  Functional Limitation: Mobility: Walking and moving around  Mobility: Walking and Moving Around Current Status (): CH  Mobility: Walking and Moving Around Goal Status (): CH  Mobility: Walking and Moving Around Discharge Status (): JETT Menjivar, PT  06/04/2017

## 2017-06-04 NOTE — PLAN OF CARE
Problem: Patient Care Overview  Goal: Plan of Care Review  Outcome: Ongoing (interventions implemented as appropriate)  Patient AAO and VSS. Remains free of injury. Fall precautions maintained with bed low and wheels locked.  IVF administered as ordered. Pain adequately managed with prn meds. Dressing to right axilla clean, dry, and intact.  Chart review for 24 hours completed. Will continue to monitor till discharge.

## 2017-06-04 NOTE — NURSING
No available appt for  on 6/6/17 in [facility]. Message sent to clinic to schedule appt and call pt with appt time.

## 2017-06-04 NOTE — CONSULTS
Consult received for discharge planning.  Patient from home and independent.  Had no DME or services, e.g., HH.  Patient has transportation to and from her appts. and has no difficulty paying for her Rxs.  Patient has 1-2 people to assist post discharge if needed.   Patient's physical address is [address] [place] [address].  Discharge phone number is [phone].  No needs identified this date.  6/3/2017  Kelsie Maldonado LMSW, ACBLANCA-SW, CCM

## 2017-06-04 NOTE — PLAN OF CARE
Problem: Physical Therapy Goal  Goal: Physical Therapy Goal  PT EVAL complete.  PT IND with ambulation and stairs.  Will place on people movers for daily ambulation.  Will benefit from bath bench for home

## 2017-06-04 NOTE — PLAN OF CARE
Problem: Patient Care Overview  Goal: Plan of Care Review  Outcome: Outcome(s) achieved Date Met: 06/04/17  Pt being discharged today. Discharge instructions given pt verbalize understanding. Dressing changed to right axillary pt tolerated well clean dry dressing intact. IV removed cath tip intact. 12 hr chart check complete. Pt tolerated regular diet no nausea or vomiting noted.

## 2017-06-05 ENCOUNTER — TELEPHONE (OUTPATIENT)
Dept: SURGERY | Facility: CLINIC | Age: 57
End: 2017-06-05

## 2017-06-05 LAB — BACTERIA SPEC AEROBE CULT: NORMAL

## 2017-06-05 NOTE — TELEPHONE ENCOUNTER
----- Message from Lyndsey Yousif sent at 6/5/2017  8:31 AM CDT -----  Contact: pt  Pt wants to be worked in for a f/u appt, but isn't sure when to schedule, pt  Request call at [phone]///[id number]

## 2017-06-05 NOTE — TELEPHONE ENCOUNTER
----- Message from Jessee Hopper MD sent at 6/5/2017  3:53 PM CDT -----  No, she is better off just wash with soap and water daily.     Jessee Hopper    ----- Message -----  From: Janelle Dunbar LPN  Sent: 6/5/2017  10:44 AM  To: Jessee Hopper MD    Patient wants to know if home health can be ordered to pack her wound.  ----- Message -----  From: Divina Salinas  Sent: 6/5/2017  10:30 AM  To: Jeansonne Louis O IV Staff    Patient had surgery on June 1 and she wants to know if she can get home health.  Call her at [phone].                                             aj

## 2017-06-06 ENCOUNTER — NURSE TRIAGE (OUTPATIENT)
Dept: ADMINISTRATIVE | Facility: CLINIC | Age: 57
End: 2017-06-06

## 2017-06-06 ENCOUNTER — PATIENT OUTREACH (OUTPATIENT)
Dept: ADMINISTRATIVE | Facility: CLINIC | Age: 57
End: 2017-06-06
Payer: COMMERCIAL

## 2017-06-06 LAB — BACTERIA SPEC ANAEROBE CULT: NORMAL

## 2017-06-06 RX ORDER — BENZONATATE 100 MG/1
100 CAPSULE ORAL 3 TIMES DAILY PRN
COMMUNITY

## 2017-06-06 NOTE — TELEPHONE ENCOUNTER
Reason for Disposition   Health Information question, no triage required and triager able to answer question    Protocols used: ST INFORMATION ONLY CALL-A-  pt was calling to find out the signs of infection as well as find out when her post-op appt was scheduled

## 2017-06-06 NOTE — PATIENT INSTRUCTIONS
Abscess (Incision & Drainage)  An abscess (sometimes called a boil) occurs when bacteria get trapped under the skin and start to grow. Pus forms inside the abscess as the body responds to the bacteria. An abscess can happen with an insect bite, ingrown hair, blocked oil gland, pimple, cyst, or puncture wound.  Your healthcare provider has drained the pus from your abscess. If the abscess pocket was large, your healthcare provider may have inserted gauze packing. Your provider will need to remove and possibly replace it on your next visit. You may not need antibiotics to treat a simple abscess, unless the infection is spreading into the skin around the wound (cellulitis).  Healing of the wound will take about 1 to 2 weeks, depending on the size of the abscess. Healthy tissue will grow from the bottom and sides of the opening until it seals over.  Home care  These tips can help your wound heal:  · The wound may drain for the first 2 days. Cover the wound with a clean dry dressing. If the dressing becomes soaked with blood or pus, change it.  · If a gauze packing was placed inside the abscess cavity, you may be told to remove it yourself. You may do this in the shower. Once the packing is removed, you should wash the area in the shower or bath 3 to 4 times a day, until the skin opening has closed. Make sure you wash your hands after changing the packing or cleaning the wound.  · If you were prescribed antibiotics, take them as directed until they are all gone.  · You may use acetaminophen or ibuprofen to control pain, unless another pain medicine was prescribed. If you have liver disease or ever had a stomach ulcer, talk with your doctor before using these medicines.  Follow-up care  Follow up with your healthcare provider, or as advised. If a gauze packing was inserted in your wound, it should be removed in 1 to 2 days. Check your wound every day for the signs of worsening infection listed below.  When to seek  medical advice  Call your healthcare provider right away if any of these occur:  · Increasing redness or swelling  · Red streaks in the skin leading away from the wound  · Increasing local pain or swelling  · Continued pus draining from the wound 2 days after treatment  · Fever of 100.4ºF (38ºC) or higher, or as directed by your healthcare provider  · Boil returns when you are at home  Date Last Reviewed: 9/1/2016  © 1041-6269 [employer] [address] [place] [address]. All rights reserved. This information is not intended as a substitute for professional medical care. Always follow your healthcare professional's instructions.

## 2017-06-15 ENCOUNTER — OFFICE VISIT (OUTPATIENT)
Dept: SURGERY | Facility: CLINIC | Age: 57
End: 2017-06-15
Payer: COMMERCIAL

## 2017-06-15 VITALS
BODY MASS INDEX: 38.51 KG/M2 | DIASTOLIC BLOOD PRESSURE: 77 MMHG | WEIGHT: 235 LBS | SYSTOLIC BLOOD PRESSURE: 121 MMHG | HEART RATE: 62 BPM | TEMPERATURE: 98 F

## 2017-06-15 DIAGNOSIS — L02.411 ABSCESS OF AXILLA, RIGHT: Primary | ICD-10-CM

## 2017-06-15 PROCEDURE — 99024 POSTOP FOLLOW-UP VISIT: CPT | Mod: S$GLB,,, | Performed by: SURGERY

## 2017-06-15 PROCEDURE — 99999 PR PBB SHADOW E&M-EST. PATIENT-LVL III: CPT | Mod: PBBFAC,,, | Performed by: SURGERY

## 2017-06-15 RX ORDER — CLINDAMYCIN HYDROCHLORIDE 300 MG/1
300 CAPSULE ORAL EVERY 6 HOURS
Qty: 40 CAPSULE | Refills: 0 | Status: SHIPPED | OUTPATIENT
Start: 2017-06-15 | End: 2017-06-25

## 2017-06-22 ENCOUNTER — HOSPITAL ENCOUNTER (EMERGENCY)
Facility: HOSPITAL | Age: 57
Discharge: HOME | End: 2017-06-22
Attending: EMERGENCY MEDICINE
Payer: COMMERCIAL

## 2017-06-22 VITALS
TEMPERATURE: 98 F | WEIGHT: 235 LBS | RESPIRATION RATE: 18 BRPM | HEART RATE: 72 BPM | HEIGHT: 65 IN | SYSTOLIC BLOOD PRESSURE: 122 MMHG | DIASTOLIC BLOOD PRESSURE: 70 MMHG | BODY MASS INDEX: 39.15 KG/M2 | OXYGEN SATURATION: 98 %

## 2017-06-22 DIAGNOSIS — N20.0 KIDNEY STONE: Primary | ICD-10-CM

## 2017-06-22 DIAGNOSIS — R10.32 LLQ PAIN: ICD-10-CM

## 2017-06-22 LAB
ANION GAP SERPL CALC-SCNC: 12 MMOL/L
BASOPHILS # BLD AUTO: 0.02 K/UL
BASOPHILS NFR BLD: 0.3 %
BILIRUB UR QL STRIP: NEGATIVE
BUN SERPL-MCNC: 16 MG/DL
CALCIUM SERPL-MCNC: 9.8 MG/DL
CHLORIDE SERPL-SCNC: 105 MMOL/L
CLARITY UR REFRACT.AUTO: CLEAR
CO2 SERPL-SCNC: 24 MMOL/L
COLOR UR AUTO: YELLOW
CREAT SERPL-MCNC: 1.3 MG/DL
DIFFERENTIAL METHOD: ABNORMAL
EOSINOPHIL # BLD AUTO: 0.3 K/UL
EOSINOPHIL NFR BLD: 4 %
ERYTHROCYTE [DISTWIDTH] IN BLOOD BY AUTOMATED COUNT: 15.3 %
EST. GFR  (AFRICAN AMERICAN): 51.9 ML/MIN/1.73 M^2
EST. GFR  (NON AFRICAN AMERICAN): 45 ML/MIN/1.73 M^2
GLUCOSE SERPL-MCNC: 135 MG/DL
GLUCOSE UR QL STRIP: NEGATIVE
HCT VFR BLD AUTO: 43.7 %
HGB BLD-MCNC: 14.2 G/DL
HGB UR QL STRIP: ABNORMAL
KETONES UR QL STRIP: NEGATIVE
LEUKOCYTE ESTERASE UR QL STRIP: NEGATIVE
LYMPHOCYTES # BLD AUTO: 1.2 K/UL
LYMPHOCYTES NFR BLD: 15.5 %
MCH RBC QN AUTO: 29.8 PG
MCHC RBC AUTO-ENTMCNC: 32.5 %
MCV RBC AUTO: 92 FL
MONOCYTES # BLD AUTO: 0.4 K/UL
MONOCYTES NFR BLD: 4.5 %
NEUTROPHILS # BLD AUTO: 5.9 K/UL
NEUTROPHILS NFR BLD: 75.4 %
NITRITE UR QL STRIP: NEGATIVE
PH UR STRIP: 6 [PH] (ref 5–8)
PLATELET # BLD AUTO: 148 K/UL
PMV BLD AUTO: 11.7 FL
POTASSIUM SERPL-SCNC: 3.9 MMOL/L
PROT UR QL STRIP: NEGATIVE
RBC # BLD AUTO: 4.76 M/UL
SODIUM SERPL-SCNC: 141 MMOL/L
SP GR UR STRIP: >=1.03 (ref 1–1.03)
URN SPEC COLLECT METH UR: ABNORMAL
UROBILINOGEN UR STRIP-ACNC: NEGATIVE EU/DL
WBC # BLD AUTO: 7.76 K/UL

## 2017-06-22 PROCEDURE — 81003 URINALYSIS AUTO W/O SCOPE: CPT

## 2017-06-22 PROCEDURE — 96376 TX/PRO/DX INJ SAME DRUG ADON: CPT

## 2017-06-22 PROCEDURE — 25000003 PHARM REV CODE 250: Performed by: EMERGENCY MEDICINE

## 2017-06-22 PROCEDURE — 25500020 PHARM REV CODE 255: Performed by: EMERGENCY MEDICINE

## 2017-06-22 PROCEDURE — 85025 COMPLETE CBC W/AUTO DIFF WBC: CPT

## 2017-06-22 PROCEDURE — 63600175 PHARM REV CODE 636 W HCPCS: Performed by: EMERGENCY MEDICINE

## 2017-06-22 PROCEDURE — 96375 TX/PRO/DX INJ NEW DRUG ADDON: CPT

## 2017-06-22 PROCEDURE — 80048 BASIC METABOLIC PNL TOTAL CA: CPT

## 2017-06-22 PROCEDURE — 96374 THER/PROPH/DIAG INJ IV PUSH: CPT

## 2017-06-22 PROCEDURE — 99284 EMERGENCY DEPT VISIT MOD MDM: CPT | Mod: 25

## 2017-06-22 RX ORDER — ONDANSETRON 4 MG/1
4 TABLET, FILM COATED ORAL EVERY 6 HOURS PRN
Qty: 10 TABLET | Refills: 0 | Status: SHIPPED | OUTPATIENT
Start: 2017-06-22 | End: 2020-03-11

## 2017-06-22 RX ORDER — ONDANSETRON 2 MG/ML
4 INJECTION INTRAMUSCULAR; INTRAVENOUS
Status: COMPLETED | OUTPATIENT
Start: 2017-06-22 | End: 2017-06-22

## 2017-06-22 RX ORDER — HYDROMORPHONE HYDROCHLORIDE 2 MG/ML
1 INJECTION, SOLUTION INTRAMUSCULAR; INTRAVENOUS; SUBCUTANEOUS
Status: COMPLETED | OUTPATIENT
Start: 2017-06-22 | End: 2017-06-22

## 2017-06-22 RX ORDER — HYDROCODONE BITARTRATE AND ACETAMINOPHEN 10; 325 MG/1; MG/1
1 TABLET ORAL EVERY 4 HOURS PRN
Qty: 10 TABLET | Refills: 0 | Status: SHIPPED | OUTPATIENT
Start: 2017-06-22 | End: 2017-06-29 | Stop reason: SDUPTHER

## 2017-06-22 RX ORDER — HYDROCODONE BITARTRATE AND ACETAMINOPHEN 5; 325 MG/1; MG/1
1 TABLET ORAL
Status: COMPLETED | OUTPATIENT
Start: 2017-06-22 | End: 2017-06-22

## 2017-06-22 RX ORDER — MORPHINE SULFATE 4 MG/ML
4 INJECTION, SOLUTION INTRAMUSCULAR; INTRAVENOUS
Status: COMPLETED | OUTPATIENT
Start: 2017-06-22 | End: 2017-06-22

## 2017-06-22 RX ADMIN — MORPHINE SULFATE 4 MG: 4 INJECTION, SOLUTION INTRAMUSCULAR; INTRAVENOUS at 10:06

## 2017-06-22 RX ADMIN — HYDROCODONE BITARTRATE AND ACETAMINOPHEN 1 TABLET: 5; 325 TABLET ORAL at 09:06

## 2017-06-22 RX ADMIN — ONDANSETRON 4 MG: 2 INJECTION INTRAMUSCULAR; INTRAVENOUS at 09:06

## 2017-06-22 RX ADMIN — HYDROMORPHONE HYDROCHLORIDE 1 MG: 2 INJECTION, SOLUTION INTRAMUSCULAR; INTRAVENOUS; SUBCUTANEOUS at 11:06

## 2017-06-22 RX ADMIN — IOHEXOL 75 ML: 350 INJECTION, SOLUTION INTRAVENOUS at 10:06

## 2017-06-22 RX ADMIN — ONDANSETRON 4 MG: 2 INJECTION INTRAMUSCULAR; INTRAVENOUS at 11:06

## 2017-06-22 NOTE — ED PROVIDER NOTES
"Encounter Date: 6/22/2017       History     Chief Complaint   Patient presents with    Chills     "chills nauseated throwing up since tuesday"    Nausea     The history is provided by the patient.   Abdominal Pain   The current episode started several days ago. The onset of the illness was gradual. The abdominal pain is located in the suprapubic region. The abdominal pain does not radiate. The abdominal pain is relieved by nothing. Exacerbated by: nausea. The other symptoms of the illness include nausea. The other symptoms of the illness do not include fever, melena, diarrhea, vaginal discharge or vaginal bleeding.   Additional symptoms associated with the illness include chills.     Review of patient's allergies indicates:  No Known Allergies  Past Medical History:   Diagnosis Date    Arthritis     GERD (gastroesophageal reflux disease)     Hypertension      Past Surgical History:   Procedure Laterality Date    COLONOSCOPY       Family History   Problem Relation Age of Onset    Cancer Mother     Diabetes Mother     Hypertension Mother     Diabetes Sister     Hypertension Sister     Cancer Brother      Social History   Substance Use Topics    Smoking status: Never Smoker    Smokeless tobacco: Not on file    Alcohol use No     Review of Systems   Constitutional: Positive for chills. Negative for fever.   Gastrointestinal: Positive for abdominal pain and nausea. Negative for diarrhea and melena.   Genitourinary: Negative for vaginal bleeding and vaginal discharge.   All other systems reviewed and are negative.      Physical Exam     Initial Vitals [06/22/17 0905]   BP Pulse Resp Temp SpO2   (!) 146/88 85 20 98.4 °F (36.9 °C) 99 %      MAP       107.33         Physical Exam    Nursing note and vitals reviewed.  Constitutional: She appears well-developed.   HENT:   Head: Normocephalic and atraumatic.   Eyes: EOM are normal. Pupils are equal, round, and reactive to light.   Neck: Normal range of motion. " Neck supple.   Cardiovascular: Normal rate, regular rhythm, normal heart sounds and intact distal pulses. Exam reveals no friction rub.    No murmur heard.  Pulmonary/Chest: Breath sounds normal. No respiratory distress. She has no wheezes. She has no rhonchi. She has no rales.   Abdominal: Soft. She exhibits no distension and no mass. There is no tenderness. There is no rebound and no guarding.   Musculoskeletal: Normal range of motion. She exhibits no edema or tenderness.   Neurological: She is alert and oriented to person, place, and time.   Grossly neuro intact.   Psychiatric: She has a normal mood and affect. Her behavior is normal. Judgment and thought content normal.         ED Course   Procedures  Labs Reviewed   CBC W/ AUTO DIFFERENTIAL   BASIC METABOLIC PANEL   URINALYSIS         Results for orders placed or performed during the hospital encounter of 06/22/17   CBC auto differential   Result Value Ref Range    WBC 7.76 3.90 - 12.70 K/uL    RBC 4.76 4.00 - 5.40 M/uL    Hemoglobin 14.2 12.0 - 16.0 g/dL    Hematocrit 43.7 37.0 - 48.5 %    MCV 92 82 - 98 fL    MCH 29.8 27.0 - 31.0 pg    MCHC 32.5 32.0 - 36.0 %    RDW 15.3 (H) 11.5 - 14.5 %    Platelets 148 (L) 150 - 350 K/uL    MPV 11.7 9.2 - 12.9 fL    Gran # 5.9 1.8 - 7.7 K/uL    Lymph # 1.2 1.0 - 4.8 K/uL    Mono # 0.4 0.3 - 1.0 K/uL    Eos # 0.3 0.0 - 0.5 K/uL    Baso # 0.02 0.00 - 0.20 K/uL    Gran% 75.4 (H) 38.0 - 73.0 %    Lymph% 15.5 (L) 18.0 - 48.0 %    Mono% 4.5 4.0 - 15.0 %    Eosinophil% 4.0 0.0 - 8.0 %    Basophil% 0.3 0.0 - 1.9 %    Differential Method Automated    Basic metabolic panel   Result Value Ref Range    Sodium 141 136 - 145 mmol/L    Potassium 3.9 3.5 - 5.1 mmol/L    Chloride 105 95 - 110 mmol/L    CO2 24 23 - 29 mmol/L    Glucose 135 (H) 70 - 110 mg/dL    BUN, Bld 16 6 - 20 mg/dL    Creatinine 1.3 0.5 - 1.4 mg/dL    Calcium 9.8 8.7 - 10.5 mg/dL    Anion Gap 12 8 - 16 mmol/L    eGFR if African American 51.9 (A) >60 mL/min/1.73 m^2     eGFR if non African American 45.0 (A) >60 mL/min/1.73 m^2   Urinalysis   Result Value Ref Range    Specimen UA Urine, Clean Catch     Color, UA Yellow Yellow, Straw, Jody    Appearance, UA Clear Clear    pH, UA 6.0 5.0 - 8.0    Specific Gravity, UA >=1.030 (A) 1.005 - 1.030    Protein, UA Negative Negative    Glucose, UA Negative Negative    Ketones, UA Negative Negative    Bilirubin (UA) Negative Negative    Occult Blood UA Trace (A) Negative    Nitrite, UA Negative Negative    Urobilinogen, UA Negative <2.0 EU/dL    Leukocytes, UA Negative Negative     Imaging Results          CT Abdomen Pelvis With Contrast (Final result)  Result time 06/22/17 10:57:59    Final result by Pablo Syed III, MD (06/22/17 10:57:59)                 Impression:      1. Multiple lung nodules suspicious for metastases.  Clinical correlation and appropriate oncologic workup is recommended.  2.  Regional asymmetry in the upper inner quadrant of the right breast.  Diagnostic mammogram and ultrasound is recommended.  3.  1 or 2 small distal left ureteral calculi causing mild left hydronephrosis.  No other acute abnormality identified.  4.  Uterine fibroids.        Electronically signed by: PABLO SYED MD  Date:     06/22/17  Time:    10:57              Narrative:    CT ABDOMEN PELVIS WITH CONTRAST    Clinical history:  R10.32 Left lower quadrant pain    TECHNIQUE: Routine abdominal and pelvic CT performed after the IV administration of 75 mL Omnipaque 350. Coronal and sagittal images obtained. All CT scans at this facility use dose modulation, iterative reconstruction, and/or weight based dosing when appropriate to reduce radiation dose to as low as reasonably achievable.    Comparison: None    FINDINGS: There are multiple noncalcified nodules in bilateral lower lobes, right middle lobe and lingula measure to 1.9 cm and the left lung base suspicious for metastases.  There is regional asymmetric soft tissue density in the upper inner  quadrant of the right breast.  No acute osseous abnormality or suspicious bone marrow lesion identified.     There's a 3 mm distal left ureteral calculus near the UVJ causing mild left hydroureteronephrosis with decreased enhancement of the left kidney.  There is an adjacent punctate 1 mm calculus in the more superior aspect of the distal ureter versus periureteral phlebolith (image 74).  Left the right kidney and right ureter are unremarkable.    There is no evidence of bowel obstruction, acute inflammatory process or other acute bowel pathology. The stomach is within normal limits. No evidence of appendicitis. No free intraperitoneal fluid, free air or abscess identified.    The liver is unremarkable. The gallbladder and bile ducts are unremarkable. The pancreas, spleen and adrenals are unremarkable. No aortic aneurysm is identified. The bladder is within normal limits.  No pathologically enlarged lymph nodes are identified.  There is a 3.5 cm partially calcified right uterine fibroid.  There is a 2.3 cm fibroid in the left uterine fundus.  No suspicious ovarian mass identified.                                                   ED Course   10:21 AM  Patient stable at this time.  Patient states her pain remains the same.  I will order  Morphine 4mg IV at this time.  Patient will go for CT of the abd/pelvis.    11:28 AM  Patient currently state.  I have discussed the CT findings with the patient.  I have informed the patient that she needs to call her PCP Dr. Ferris today to schedule an appointment for further evaluation of the nodules in her lungs and irregularity in the right breast.  Patient will be discharge home with instructions to return to the ED for any concerns or worsening of condition.    ED ONGOING VITALS:  Vitals:    06/22/17 0905 06/22/17 1026 06/22/17 1116   BP: (!) 146/88 134/71 124/76   Pulse: 85 73 76   Resp: 20 18 18   Temp: 98.4 °F (36.9 °C)     TempSrc: Oral     SpO2: 99% 97% 97%   Weight: 106.6  "kg (235 lb)     Height: 5' 5" (1.651 m)           ABNORMAL LAB VALUES:  Labs Reviewed   CBC W/ AUTO DIFFERENTIAL - Abnormal; Notable for the following:        Result Value    RDW 15.3 (*)     Platelets 148 (*)     Gran% 75.4 (*)     Lymph% 15.5 (*)     All other components within normal limits   BASIC METABOLIC PANEL - Abnormal; Notable for the following:     Glucose 135 (*)     eGFR if  51.9 (*)     eGFR if non  45.0 (*)     All other components within normal limits   URINALYSIS - Abnormal; Notable for the following:     Specific Gravity, UA >=1.030 (*)     Occult Blood UA Trace (*)     All other components within normal limits         ALL LAB VALUES:  Results for orders placed or performed during the hospital encounter of 06/22/17   CBC auto differential   Result Value Ref Range    WBC 7.76 3.90 - 12.70 K/uL    RBC 4.76 4.00 - 5.40 M/uL    Hemoglobin 14.2 12.0 - 16.0 g/dL    Hematocrit 43.7 37.0 - 48.5 %    MCV 92 82 - 98 fL    MCH 29.8 27.0 - 31.0 pg    MCHC 32.5 32.0 - 36.0 %    RDW 15.3 (H) 11.5 - 14.5 %    Platelets 148 (L) 150 - 350 K/uL    MPV 11.7 9.2 - 12.9 fL    Gran # 5.9 1.8 - 7.7 K/uL    Lymph # 1.2 1.0 - 4.8 K/uL    Mono # 0.4 0.3 - 1.0 K/uL    Eos # 0.3 0.0 - 0.5 K/uL    Baso # 0.02 0.00 - 0.20 K/uL    Gran% 75.4 (H) 38.0 - 73.0 %    Lymph% 15.5 (L) 18.0 - 48.0 %    Mono% 4.5 4.0 - 15.0 %    Eosinophil% 4.0 0.0 - 8.0 %    Basophil% 0.3 0.0 - 1.9 %    Differential Method Automated    Basic metabolic panel   Result Value Ref Range    Sodium 141 136 - 145 mmol/L    Potassium 3.9 3.5 - 5.1 mmol/L    Chloride 105 95 - 110 mmol/L    CO2 24 23 - 29 mmol/L    Glucose 135 (H) 70 - 110 mg/dL    BUN, Bld 16 6 - 20 mg/dL    Creatinine 1.3 0.5 - 1.4 mg/dL    Calcium 9.8 8.7 - 10.5 mg/dL    Anion Gap 12 8 - 16 mmol/L    eGFR if African American 51.9 (A) >60 mL/min/1.73 m^2    eGFR if non African American 45.0 (A) >60 mL/min/1.73 m^2   Urinalysis   Result Value Ref Range    Specimen " UA Urine, Clean Catch     Color, UA Yellow Yellow, Straw, Jody    Appearance, UA Clear Clear    pH, UA 6.0 5.0 - 8.0    Specific Gravity, UA >=1.030 (A) 1.005 - 1.030    Protein, UA Negative Negative    Glucose, UA Negative Negative    Ketones, UA Negative Negative    Bilirubin (UA) Negative Negative    Occult Blood UA Trace (A) Negative    Nitrite, UA Negative Negative    Urobilinogen, UA Negative <2.0 EU/dL    Leukocytes, UA Negative Negative         RADIOLOGY STUDIES:  Imaging Results          CT Abdomen Pelvis With Contrast (Final result)  Result time 06/22/17 10:57:59    Final result by Pablo Syed III, MD (06/22/17 10:57:59)                 Impression:      1. Multiple lung nodules suspicious for metastases.  Clinical correlation and appropriate oncologic workup is recommended.  2.  Regional asymmetry in the upper inner quadrant of the right breast.  Diagnostic mammogram and ultrasound is recommended.  3.  1 or 2 small distal left ureteral calculi causing mild left hydronephrosis.  No other acute abnormality identified.  4.  Uterine fibroids.        Electronically signed by: PABLO SYED MD  Date:     06/22/17  Time:    10:57              Narrative:    CT ABDOMEN PELVIS WITH CONTRAST    Clinical history:  R10.32 Left lower quadrant pain    TECHNIQUE: Routine abdominal and pelvic CT performed after the IV administration of 75 mL Omnipaque 350. Coronal and sagittal images obtained. All CT scans at this facility use dose modulation, iterative reconstruction, and/or weight based dosing when appropriate to reduce radiation dose to as low as reasonably achievable.    Comparison: None    FINDINGS: There are multiple noncalcified nodules in bilateral lower lobes, right middle lobe and lingula measure to 1.9 cm and the left lung base suspicious for metastases.  There is regional asymmetric soft tissue density in the upper inner quadrant of the right breast.  No acute osseous abnormality or suspicious bone marrow  lesion identified.     There's a 3 mm distal left ureteral calculus near the UVJ causing mild left hydroureteronephrosis with decreased enhancement of the left kidney.  There is an adjacent punctate 1 mm calculus in the more superior aspect of the distal ureter versus periureteral phlebolith (image 74).  Left the right kidney and right ureter are unremarkable.    There is no evidence of bowel obstruction, acute inflammatory process or other acute bowel pathology. The stomach is within normal limits. No evidence of appendicitis. No free intraperitoneal fluid, free air or abscess identified.    The liver is unremarkable. The gallbladder and bile ducts are unremarkable. The pancreas, spleen and adrenals are unremarkable. No aortic aneurysm is identified. The bladder is within normal limits.  No pathologically enlarged lymph nodes are identified.  There is a 3.5 cm partially calcified right uterine fibroid.  There is a 2.3 cm fibroid in the left uterine fundus.  No suspicious ovarian mass identified.                                The above vital signs and test results have been reviewed by the emergency provider.       ED MEDICATIONS:  Medications   hydromorphone (PF) injection 1 mg (not administered)   hydrocodone-acetaminophen 5-325mg per tablet 1 tablet (1 tablet Oral Given 6/22/17 0928)   ondansetron injection 4 mg (4 mg Intravenous Given 6/22/17 0928)   morphine injection 4 mg (4 mg Intravenous Given 6/22/17 1025)   omnipaque 350 iohexol 75 mL (75 mLs Intravenous Given 6/22/17 1040)           ED EVENTS:    11:32 AM RE-EVALUATION & DISPOSITION:   Reassessment at the time of disposition demonstrates that the patient is resting comfortably in no acute distress.  She has remained hemodynamically stable throughout the entire ED visit and is without objective evidence for acute process requiring urgent intervention or hospitalization. I discussed test results and provided counseling to patient with regard to condition,  the treatment plan, specific conditions for return, and the importance of follow up.  Answered questions at this time. The patient is stable for discharge.     New Prescriptions    No medications on file      Clinical Impression:     1. Kidney stone    2. LLQ pain                             Tan Prather MD  06/22/17 0753

## 2017-06-26 NOTE — PROGRESS NOTES
Dianne Hernández is status post I&D of axilla and presents today for follow-up care.  She is doing local wound care at home.    PE: wound is clean with good granulation tissue    CX:  actinomyces    A/P:  Normal post-operative course.    Continue wound care.  RETURN TO CLINIC 1 month.

## 2017-06-29 ENCOUNTER — OFFICE VISIT (OUTPATIENT)
Dept: SURGERY | Facility: CLINIC | Age: 57
End: 2017-06-29
Payer: COMMERCIAL

## 2017-06-29 VITALS
SYSTOLIC BLOOD PRESSURE: 123 MMHG | HEART RATE: 68 BPM | BODY MASS INDEX: 38.3 KG/M2 | DIASTOLIC BLOOD PRESSURE: 81 MMHG | WEIGHT: 230.19 LBS | TEMPERATURE: 99 F

## 2017-06-29 DIAGNOSIS — N20.0 KIDNEY STONE: ICD-10-CM

## 2017-06-29 DIAGNOSIS — L02.411 ABSCESS OF AXILLA, RIGHT: Primary | ICD-10-CM

## 2017-06-29 PROCEDURE — 99024 POSTOP FOLLOW-UP VISIT: CPT | Mod: S$GLB,,, | Performed by: SURGERY

## 2017-06-29 PROCEDURE — 99999 PR PBB SHADOW E&M-EST. PATIENT-LVL III: CPT | Mod: PBBFAC,,, | Performed by: SURGERY

## 2017-06-29 RX ORDER — HYDROCODONE BITARTRATE AND ACETAMINOPHEN 10; 325 MG/1; MG/1
1 TABLET ORAL EVERY 4 HOURS PRN
Qty: 30 TABLET | Refills: 0 | Status: SHIPPED | OUTPATIENT
Start: 2017-06-29 | End: 2020-08-19

## 2017-06-29 NOTE — PATIENT INSTRUCTIONS
Healthy Eating    A healthy diet consists of a variety of foods, without excess amounts of any one food.  Sugary drinks and snacks should be avoided.  Check to make sure all drinks are zero calorie (water is best but other zero calorie drinks are acceptable).  Whole grains (wheat bread, brown rice, wheat pasta) are healthier than white bread, white rice, or regular pasta.    Do not eat until you are full.  Choose a small portion of each food in advance, eat and then stop.  When eating out, restaurant portions tend to be too large, so eat no more than half and save the rest.    Reading food labels can help you make healthy choices.  Pay close attention to the number of calories in all food and drinks.       All the values on the label are based on one serving. The serving size is the average portion. Remember to multiply the values on the label by the number of servings you eat.     Meat  In general, fish is the healthiest type of meat, followed by turkey, then chicken.  Baked is healthier than fried.  Red meat is the least healthy and should only be eaten occasionally.    Eat more fiber  High-fiber foods are digested more slowly than low-fiber foods, so you feel full longer. Foods high in fiber include:  · Vegetables and fruits  · Whole-grain or bran breads, pastas, and cereals  · Legumes (beans) and peas  As you begin to eat more fiber, be sure to drink plenty of water to keep your digestive system working smoothly.    Tips  · Dont skip meals. This often leads to overeating later on. Its best to spread your eating throughout the day.  · Eat a variety of foods, not just a few favorites.  · If you find yourself eating when youre not hungry, ask yourself why. Many of us eat when were bored, stressed, or just to be polite. Listen to your body. If youre not hungry, get busy doing something else instead of eating.  · Eat slower, shooting for 20 to 30 minutes for each meal. It takes 20 minutes for your stomach to  tell your brain that its full. Slow eaters tend to eat less and are still satisfied, while fast eaters may tend to be overeaters.   · Pay attention to what you eat. Dont read or watch TV during your meal.  · Fat-free popcorn such as [employer] Smartpop is a great low calorie snack if you are hungry between meals.      Date Last Reviewed: 1/31/2016  © 3118-3555 [employer]. [address] [place] [address], [place] [address]. All rights reserved.

## 2017-07-25 ENCOUNTER — OFFICE VISIT (OUTPATIENT)
Dept: SURGERY | Facility: CLINIC | Age: 57
End: 2017-07-25
Payer: COMMERCIAL

## 2017-07-25 VITALS
HEART RATE: 71 BPM | WEIGHT: 234.81 LBS | DIASTOLIC BLOOD PRESSURE: 63 MMHG | TEMPERATURE: 98 F | SYSTOLIC BLOOD PRESSURE: 146 MMHG | BODY MASS INDEX: 39.07 KG/M2

## 2017-07-25 DIAGNOSIS — Z98.890 STATUS POST INCISION AND DRAINAGE: Primary | ICD-10-CM

## 2017-07-25 PROCEDURE — 99999 PR PBB SHADOW E&M-EST. PATIENT-LVL II: CPT | Mod: PBBFAC,,, | Performed by: SURGERY

## 2017-07-25 PROCEDURE — 99024 POSTOP FOLLOW-UP VISIT: CPT | Mod: S$GLB,,, | Performed by: SURGERY

## 2017-07-25 RX ORDER — ALPRAZOLAM 0.5 MG/1
0.5 TABLET ORAL
COMMUNITY
End: 2019-12-11 | Stop reason: SDUPTHER

## 2017-07-25 RX ORDER — ALLOPURINOL 300 MG/1
300 TABLET ORAL
COMMUNITY
End: 2020-07-12

## 2017-07-25 NOTE — PROGRESS NOTES
Dianne is 57-year-old morbidly obese -American female patient who was initially seen yearly 2 month ago for known right axillary abscess.  She was taken to the operating room for incision and drainage.  She did well postoperatively, and was seen by Dr. jeansonne for a postop follow-up.  She has returned to my office for a second postop follow-up.  The incision has all healed up.  No signs of any complication noted.  Patient is released.    Jessee Hopper

## 2017-08-07 ENCOUNTER — TELEPHONE (OUTPATIENT)
Dept: UROLOGY | Facility: CLINIC | Age: 57
End: 2017-08-07

## 2019-12-11 PROBLEM — B35.1 DERMATOPHYTOSIS OF NAIL: Status: ACTIVE | Noted: 2019-12-11

## 2019-12-11 PROBLEM — R73.9 ELEVATED BLOOD SUGAR: Status: ACTIVE | Noted: 2019-12-11

## 2019-12-11 PROBLEM — E11.9 DIABETES MELLITUS WITHOUT COMPLICATION: Status: ACTIVE | Noted: 2019-12-11

## 2019-12-11 PROBLEM — E66.01 MORBID OBESITY WITH BODY MASS INDEX (BMI) OF 40.0 TO 44.9 IN ADULT: Status: ACTIVE | Noted: 2019-12-11

## 2019-12-11 PROBLEM — C7A.090: Status: ACTIVE | Noted: 2019-12-11

## 2019-12-11 PROBLEM — F41.9 ANXIETY: Status: ACTIVE | Noted: 2019-12-11

## 2019-12-11 PROBLEM — M19.90 ARTHRITIS: Status: ACTIVE | Noted: 2019-12-11

## 2019-12-11 PROBLEM — K21.9 GERD (GASTROESOPHAGEAL REFLUX DISEASE): Status: ACTIVE | Noted: 2019-12-11

## 2019-12-11 PROBLEM — I10 HYPERTENSION: Status: ACTIVE | Noted: 2019-12-11

## 2020-03-11 PROBLEM — R73.9 ELEVATED BLOOD SUGAR: Status: RESOLVED | Noted: 2019-12-11 | Resolved: 2020-03-11

## 2020-08-19 PROBLEM — M25.561 ACUTE PAIN OF BOTH KNEES: Status: ACTIVE | Noted: 2020-08-19

## 2020-08-19 PROBLEM — E78.5 HYPERLIPIDEMIA: Status: ACTIVE | Noted: 2020-08-19

## 2020-08-19 PROBLEM — M25.562 ACUTE PAIN OF BOTH KNEES: Status: ACTIVE | Noted: 2020-08-19

## 2020-12-22 ENCOUNTER — APPOINTMENT (OUTPATIENT)
Dept: RADIOLOGY | Facility: HOSPITAL | Age: 60
End: 2020-12-22
Attending: FAMILY MEDICINE
Payer: COMMERCIAL

## 2020-12-22 DIAGNOSIS — E28.39 OVARIAN FAILURE: ICD-10-CM

## 2020-12-22 PROCEDURE — 77080 DXA BONE DENSITY AXIAL: CPT | Mod: 26,,, | Performed by: RADIOLOGY

## 2020-12-22 PROCEDURE — 77080 DEXA BONE DENSITY SPINE HIP: ICD-10-PCS | Mod: 26,,, | Performed by: RADIOLOGY

## 2020-12-22 PROCEDURE — 77080 DXA BONE DENSITY AXIAL: CPT | Mod: TC

## 2022-07-26 ENCOUNTER — OFFICE VISIT (OUTPATIENT)
Dept: PODIATRY | Facility: CLINIC | Age: 62
End: 2022-07-26
Payer: COMMERCIAL

## 2022-07-26 VITALS — WEIGHT: 250 LBS | HEIGHT: 65 IN | BODY MASS INDEX: 41.65 KG/M2

## 2022-07-26 DIAGNOSIS — E11.9 DIABETES MELLITUS WITHOUT COMPLICATION: ICD-10-CM

## 2022-07-26 DIAGNOSIS — L60.0 INGROWN TOENAIL: ICD-10-CM

## 2022-07-26 DIAGNOSIS — B35.1 DERMATOPHYTOSIS OF NAIL: ICD-10-CM

## 2022-07-26 DIAGNOSIS — E66.01 MORBID OBESITY WITH BODY MASS INDEX (BMI) OF 40.0 TO 44.9 IN ADULT: Primary | ICD-10-CM

## 2022-07-26 DIAGNOSIS — E11.9 COMPREHENSIVE DIABETIC FOOT EXAMINATION, TYPE 2 DM, ENCOUNTER FOR: ICD-10-CM

## 2022-07-26 PROCEDURE — 99999 PR PBB SHADOW E&M-EST. PATIENT-LVL V: ICD-10-PCS | Mod: PBBFAC,,, | Performed by: PODIATRIST

## 2022-07-26 PROCEDURE — 3008F BODY MASS INDEX DOCD: CPT | Mod: CPTII,S$GLB,, | Performed by: PODIATRIST

## 2022-07-26 PROCEDURE — 99999 PR PBB SHADOW E&M-EST. PATIENT-LVL V: CPT | Mod: PBBFAC,,, | Performed by: PODIATRIST

## 2022-07-26 PROCEDURE — 99203 OFFICE O/P NEW LOW 30 MIN: CPT | Mod: 25,S$GLB,, | Performed by: PODIATRIST

## 2022-07-26 PROCEDURE — 3008F PR BODY MASS INDEX (BMI) DOCUMENTED: ICD-10-PCS | Mod: CPTII,S$GLB,, | Performed by: PODIATRIST

## 2022-07-26 PROCEDURE — 3044F HG A1C LEVEL LT 7.0%: CPT | Mod: CPTII,S$GLB,, | Performed by: PODIATRIST

## 2022-07-26 PROCEDURE — 11721 PR DEBRIDEMENT OF NAILS, 6 OR MORE: ICD-10-PCS | Mod: S$GLB,,, | Performed by: PODIATRIST

## 2022-07-26 PROCEDURE — 11721 DEBRIDE NAIL 6 OR MORE: CPT | Mod: S$GLB,,, | Performed by: PODIATRIST

## 2022-07-26 PROCEDURE — 1159F PR MEDICATION LIST DOCUMENTED IN MEDICAL RECORD: ICD-10-PCS | Mod: CPTII,S$GLB,, | Performed by: PODIATRIST

## 2022-07-26 PROCEDURE — 3044F PR MOST RECENT HEMOGLOBIN A1C LEVEL <7.0%: ICD-10-PCS | Mod: CPTII,S$GLB,, | Performed by: PODIATRIST

## 2022-07-26 PROCEDURE — 1159F MED LIST DOCD IN RCRD: CPT | Mod: CPTII,S$GLB,, | Performed by: PODIATRIST

## 2022-07-26 PROCEDURE — 99203 PR OFFICE/OUTPT VISIT, NEW, LEVL III, 30-44 MIN: ICD-10-PCS | Mod: 25,S$GLB,, | Performed by: PODIATRIST

## 2022-07-26 NOTE — PROGRESS NOTES
PODIATRIC MEDICINE AND SURGERY     CHIEF COMPLAINT  Chief Complaint   Patient presents with    Nail Care     Nail Care, c/o left fourth toe pain, x over 1 month, rates pain 5/10, also c/o Left ankle pain, Posterior aspect, x 1 year, rates pain 5/10, diabetic wears casual shoes and socks, Last seen Analia Lopez, [place] 07/13/2022         HPI    SUBJECTIVE: Dianne Hernández is a 62 y.o. female who  has a past medical history of Anxiety, Arthritis, Constipated, Diabetes mellitus, type 2, GERD (gastroesophageal reflux disease), Gout, unspecified, and Hypertension. Parindapresents to clinic for high risk diabetic foot exam and care.  Dianne denies numbness, burning, and/or tingling sensations in their feet. Patient admits to painful toenails aggravated by increased weight bearing, shoe gear, and pressure. States pain is relieved with routine debridements. She admits to history of ankle pain, however she currently kenny snot have pain. She also states she sprained her left fourth toe 1 month ago. This also has resolved in regards to pain. Patient has no other pedal complaints at this time.    This patient has documented high risk feet requiring routine maintenance secondary to diabetes mellitis and those secondary complications of diabetes, as mentioned.      HgA1c:   Hemoglobin A1C   Date Value Ref Range Status   04/13/2022 6.7 % Final   10/27/2021 6.9 % Final   07/27/2021 0.0 % Final         [facility]  Past Medical History:   Diagnosis Date    Anxiety     Arthritis     Constipated     Diabetes mellitus, type 2     GERD (gastroesophageal reflux disease)     Gout, unspecified     Hypertension      Patient Active Problem List   Diagnosis    Abscess of axilla, right    Hypertension    GERD (gastroesophageal reflux disease)    Arthritis    Diabetes mellitus without complication    Anxiety    Bronchial adenoma, carcinoid    Morbid obesity with body mass index (BMI) of 40.0 to 44.9 in adult    Dermatophytosis of nail     Constipated    Acute pain of both knees    Hyperlipidemia       MEDS  Current Outpatient Medications on File Prior to Visit   Medication Sig Dispense Refill    allopurinoL (ZYLOPRIM) 300 MG tablet Take 1 tablet (300 mg total) by mouth once daily. 90 tablet 0    ALPRAZolam (XANAX) 0.5 MG tablet Take 1 tablet by mouth twice daily as needed for anxiety 45 tablet 0    benzonatate (TESSALON) 100 MG capsule Take 100 mg by mouth 3 (three) times daily as needed for Cough.      blood sugar diagnostic (TRUE METRIX GLUCOSE TEST STRIP) Strp Use one strip to test glucose twice daily 300 each 5    cetirizine (ZYRTEC) 10 MG tablet TAKE 1 TABLET BY MOUTH ONCE DAILY AS NEEDED FOR  ALLERGIES 30 tablet 2    doxazosin (CARDURA) 2 MG tablet Take 1 tablet (2 mg total) by mouth every evening. 90 tablet 1    fluticasone propionate (FLONASE) 50 mcg/actuation nasal spray 2 sprays (100 mcg total) by Each Nostril route once daily. 9.9 mL 3    linaCLOtide (LINZESS) 290 mcg Cap capsule TAKE 1 CAPSULE BY MOUTH ONCE DAILY AS NEEDED FOR  CONSTIPATION 90 capsule 1    metFORMIN (GLUCOPHAGE-XR) 500 MG ER 24hr tablet Take 1 tablet by mouth twice daily 180 tablet 0    metoprolol succinate (TOPROL-XL) 100 MG 24 hr tablet Take 1 tablet (100 mg total) by mouth 2 (two) times daily. 180 tablet 0    montelukast (SINGULAIR) 10 mg tablet Take 1 tablet (10 mg total) by mouth every evening. 90 tablet 1    nabumetone (RELAFEN) 750 MG tablet Take 1 tablet (750 mg total) by mouth 2 (two) times daily as needed for Pain. 45 tablet 1    olmesartan-amLODIPin-hcthiazid 40-10-12.5 mg Tab Take 1 tablet by mouth once daily. 90 tablet 0    omeprazole (PRILOSEC) 20 MG capsule Take 20 mg by mouth once daily.      omeprazole (PRILOSEC) 40 MG capsule Take 40 mg by mouth once daily.      phenylephrine HCL 0.25% 0.25 % suppository Place 1 suppository rectally 3 (three) times daily as needed for Hemorrhoids. 24 suppository 2    potassium chloride SA  "(K-DUR,KLOR-CON) 20 MEQ tablet Take 1 tablet (20 mEq total) by mouth once daily. 90 tablet 0    pravastatin (PRAVACHOL) 10 MG tablet Take 1 tablet (10 mg total) by mouth once daily. 90 tablet 1    triamcinolone acetonide 0.1% (KENALOG) 0.1 % cream Apply topically 2 (two) times daily. 45 g 2    TRUEPLUS LANCETS 33 gauge Misc Use one test strip to check glucose twice daily. 300 each 5     No current facility-administered medications on file prior to visit.       [facility]     Past Surgical History:   Procedure Laterality Date    COLONOSCOPY      ESOPHAGOGASTRODUODENOSCOPY  12/08/2016    HEMORRHOID SURGERY  2018    X3        ALL  Review of patient's allergies indicates:  No Known Allergies    SOC     Social History     Tobacco Use    Smoking status: Never Smoker    Smokeless tobacco: Never Used   Substance Use Topics    Alcohol use: No    Drug use: No         Family HX    Family History   Problem Relation Age of Onset    Cancer Mother     Diabetes Mother     Hypertension Mother     Diabetes Sister     Hypertension Sister     Cancer Brother             REVIEW OF SYSTEMS  General: Denies any fever or chills  Chest: Denies shortness of breath, wheezing, coughing, or sputum production  Heart: Denies chest pain.  As noted above and per history of current illness above, otherwise negative in the remainder of the 14 systems.     PHYSICAL EXAM  Vitals:    07/26/22 1043   Weight: 113.4 kg (250 lb)   Height: 5' 5" (1.651 m)   PainSc:   5       GEN:  This patient is well-developed, well-nourished and appears stated age, well-oriented to person, place and time, and cooperative and pleasant on today's visit.      LOWER EXTREMITY    VASCULAR  DP pedal pulse 2/4 RIGHT, LEFT2/4   PT pedal pulse 2/4 RIGHT, LEFT2/4  Capillary refill time immediate to the toes.   Feet are warm to the touch. Skin temperature warm to warm from proximally to distally   There are no varicosities, telangiectasias noted to bilateral foot and ankle " regions.   There are no ecchymoses noted to bilateral foot and ankle regions.   There is mild gross lower extremity edema.    DERMATOLOGIC  Skin moist with healthy texture and turgor.  Thickened, dystrophic, elongated, discolored toenails with subungal debris 1-5 b/l  Incurvated b/l hallux   There are no open ulcerations, lacerations, or fissures to bilateral foot and ankle regions. There are no signs of infection as there is no erythema, no proximal-extending lymphangiitis, no fluctuance, or crepitus noted on palpation to bilateral foot and ankle regions.   There is no interdigital maceration.   There are hyperkeratotic lesions noted to feet.    NEUROLOGIC  Protective sensation intact at 10/10 sites upon examination with [place]eman Weinsten 5.07 g monofilament.  Propioception intact at 1st MTPJ b/l.  Achilles and patellar deep tendon reflexes intact  Babinski reflex absent    ORTHOPEDIC/BIOMECHANICAL  No symptomatic structural abnormalities noted. Muscle strength is 5/5 for foot inverters, everters, plantarflexors, and dorsiflexors. Muscle tone is normal.  I  nspection/palpation of bone, joints and muscles unremarkable.    ASSESSMENT  Morbid obesity with body mass index (BMI) of 40.0 to 44.9 in adult    Dermatophytosis of nail  -     Ambulatory referral/consult to Podiatry    Diabetes mellitus without complication  -     Ambulatory referral/consult to Podiatry    Comprehensive diabetic foot examination, type 2 DM, encounter for    Ingrown toenail        PLAN  -The patient was examined and evaulated  -Discuss presenting problems, etiology, pathologic processes and management options with patient today.   -I counseled the patient on their conditions, their implications and medical management. An in depth discussion on diabetic management, risk prevention, amputation prevention verbally and provided educational literature in written format.  -With patient's permission, the elongated onychomycotic toenails, as outlined in  the physical examination, were sharply debrided/trimmed with a double action nail nipper to their soft tissue attachment. If indicated, the nails were then smoothed down in thickness with an Bartlett board to facilitate in further debridement removing all offending nail and subungual debris. Patient relates relief following the procedure.     Utilizing sterile toenail clippers I aggressively trimmed the offending nail border approximately 3 mm from its edge and carried the nail plate incision down at an angle in order to wedge out the offending cryptotic portion of the nail plate. The offending border was then removed in toto. The remaining nail was grinded down with an electric  down to nail bed. Minimal blood was drawn. Applied betadine and covered with band-aid. Patient tolerated the procedure well and related significant relief.      - Shoe inspection. Diabetic Foot Education. Patient reminded of the importance of good nutrition and blood sugar control to help prevent podiatric complications of diabetes. Patient instructed on proper foot hygeine. We discussed wearing proper shoe gear, daily foot inspections, never walking without protective shoe gear, never putting sharp instruments to feet, routine podiatric nail visits every 3-4 months.        Disclaimer: This note was partially prepared using a voice recognition system and is likely to have sound alike errors within the text.        Future Appointments   Date Time Provider Department Center   10/12/2022  8:15 AM [facility]       Report Electronically Signed By:     Ying Odonnell DPM   Podiatry  [facility]  7/26/2022

## 2023-03-22 ENCOUNTER — HOSPITAL ENCOUNTER (OUTPATIENT)
Dept: RADIOLOGY | Facility: HOSPITAL | Age: 63
Discharge: HOME | End: 2023-03-22
Attending: NURSE PRACTITIONER
Payer: COMMERCIAL

## 2023-03-22 DIAGNOSIS — R10.9 ABDOMINAL PAIN: ICD-10-CM

## 2023-03-22 DIAGNOSIS — R07.9 CHEST PAIN: ICD-10-CM

## 2023-03-22 PROCEDURE — 71046 XR CHEST PA AND LATERAL: ICD-10-PCS | Mod: 26,,, | Performed by: RADIOLOGY

## 2023-03-22 PROCEDURE — 76700 US EXAM ABDOM COMPLETE: CPT | Mod: 26,,, | Performed by: STUDENT IN AN ORGANIZED HEALTH CARE EDUCATION/TRAINING PROGRAM

## 2023-03-22 PROCEDURE — 71046 X-RAY EXAM CHEST 2 VIEWS: CPT | Mod: 26,,, | Performed by: RADIOLOGY

## 2023-03-22 PROCEDURE — 71046 X-RAY EXAM CHEST 2 VIEWS: CPT | Mod: TC,PO

## 2023-03-22 PROCEDURE — 76700 US EXAM ABDOM COMPLETE: CPT | Mod: TC,PO

## 2023-03-22 PROCEDURE — 76700 US ABDOMEN COMPLETE: ICD-10-PCS | Mod: 26,,, | Performed by: STUDENT IN AN ORGANIZED HEALTH CARE EDUCATION/TRAINING PROGRAM

## 2024-12-13 ENCOUNTER — HOSPITAL ENCOUNTER (OUTPATIENT)
Dept: RADIOLOGY | Facility: HOSPITAL | Age: 64
Discharge: HOME | End: 2024-12-13
Attending: NURSE PRACTITIONER
Payer: MEDICARE

## 2024-12-13 DIAGNOSIS — R10.2 PELVIC PAIN: ICD-10-CM

## 2024-12-13 PROCEDURE — 76856 US EXAM PELVIC COMPLETE: CPT | Mod: TC,PO

## 2024-12-13 PROCEDURE — 76830 TRANSVAGINAL US NON-OB: CPT | Mod: 26,,, | Performed by: RADIOLOGY

## 2024-12-13 PROCEDURE — 76856 US EXAM PELVIC COMPLETE: CPT | Mod: 26,,, | Performed by: RADIOLOGY

## 2025-05-27 ENCOUNTER — HOSPITAL ENCOUNTER (EMERGENCY)
Facility: HOSPITAL | Age: 65
Discharge: HOME | End: 2025-05-27
Attending: EMERGENCY MEDICINE
Payer: MEDICARE

## 2025-05-27 VITALS
OXYGEN SATURATION: 98 % | TEMPERATURE: 98 F | SYSTOLIC BLOOD PRESSURE: 149 MMHG | HEIGHT: 65 IN | RESPIRATION RATE: 16 BRPM | HEART RATE: 72 BPM | DIASTOLIC BLOOD PRESSURE: 66 MMHG | BODY MASS INDEX: 41.51 KG/M2 | WEIGHT: 249.13 LBS

## 2025-05-27 DIAGNOSIS — R04.0 EPISTAXIS: Primary | ICD-10-CM

## 2025-05-27 PROCEDURE — 99281 EMR DPT VST MAYX REQ PHY/QHP: CPT | Mod: ER

## 2025-05-27 NOTE — ED PROVIDER NOTES
Encounter Date: 5/27/2025       History     Chief Complaint   Patient presents with    Epistaxis     Bleeding every day or every other day x 1 week. Today bleeding almost for an hour       Epistaxis   This is a recurrent problem. The current episode started today. The problem occurs intermittently. The problem has been resolved (Epistaxis resolved prior to arrival.). The problem is associated with an unknown (She denies anticoagulant therapy, facial/nasal trauma, nose picking, or any other known causes.) factor. The bleeding has been from the right nare. She has tried applying pressure for the symptoms. The treatment provided significant (Resolved prior to arrival.) relief. Her past medical history is significant for frequent nosebleeds. Past medical history comments: She reports frequent nosebleeds over the past several years, states last evaluated by ENT approximately 1 year ago.  She denies any associated symptoms at this time..     Review of patient's allergies indicates:  No Known Allergies  Past Medical History:   Diagnosis Date    Anxiety     Arthritis     Constipated     Diabetes mellitus, type 2     GERD (gastroesophageal reflux disease)     Gout, unspecified     Hypertension      Past Surgical History:   Procedure Laterality Date    COLONOSCOPY      ESOPHAGOGASTRODUODENOSCOPY  12/08/2016    HEMORRHOID SURGERY  2018    X3     Family History   Problem Relation Name Age of Onset    Cancer Mother      Diabetes Mother      Hypertension Mother      Diabetes Sister      Hypertension Sister      Cancer Brother       Social History[1]  Review of Systems   Constitutional:  Negative for chills, fatigue and fever.   HENT:  Positive for nosebleeds. Negative for congestion, ear pain, rhinorrhea, sinus pressure, sinus pain and sore throat.    Eyes:  Negative for pain.   Respiratory:  Negative for cough and shortness of breath.    Cardiovascular:  Negative for chest pain and palpitations.   Gastrointestinal:  Negative for  abdominal pain, nausea and vomiting.   Musculoskeletal:  Negative for back pain, myalgias and neck pain.   Skin:  Negative for rash.   Neurological:  Negative for dizziness, syncope, weakness, light-headedness, numbness and headaches.   Hematological:  Does not bruise/bleed easily.   All other systems reviewed and are negative.      Physical Exam     Initial Vitals [05/27/25 1110]   BP Pulse Resp Temp SpO2   (!) 149/66 72 16 98.3 °F (36.8 °C) 98 %      MAP       --         Physical Exam    Nursing note and vitals reviewed.  Constitutional: She is not diaphoretic. She is cooperative.  Non-toxic appearance. She does not have a sickly appearance. She does not appear ill. No distress.   HENT:   Head: Normocephalic and atraumatic.   Right Ear: Tympanic membrane, external ear and ear canal normal.   Left Ear: Tympanic membrane, external ear and ear canal normal.   Nose: Nose normal. No sinus tenderness. No epistaxis (No active epistaxis.  Resolved epistaxis with bleeding source consistent with right anterior epistaxis.). Right sinus exhibits no maxillary sinus tenderness. Left sinus exhibits no maxillary sinus tenderness. Mouth/Throat: Uvula is midline, oropharynx is clear and moist and mucous membranes are normal. No uvula swelling. No oropharyngeal exudate, posterior oropharyngeal edema or posterior oropharyngeal erythema.   Eyes: Conjunctivae are normal.   Neck: Neck supple.   Normal range of motion.  Cardiovascular:  Normal rate and regular rhythm.           Pulmonary/Chest: Breath sounds normal. No respiratory distress.   Musculoskeletal:         General: Normal range of motion.      Cervical back: Normal range of motion and neck supple.     Neurological: She is alert and oriented to person, place, and time. She has normal strength. GCS score is 15. GCS eye subscore is 4. GCS verbal subscore is 5. GCS motor subscore is 6.   Skin: Skin is warm and dry. Capillary refill takes less than 2 seconds.         ED Course    Procedures  Labs Reviewed - No data to display       Imaging Results    None          Medications - No data to display  Medical Decision Making              Epistaxis has resolved upon arrival to ER.  Source of bleeding appears to be right anterior right nostril.  Discussed precautions regarding nosebleeds.  She denies any known injury.  States she has been experiencing recurrent nosebleeds for years.  She denies any associated symptoms at this time.  States she is established with ENT, instructed outpatient follow-up with her ENT.  Discussed signs symptoms to return to ER.  Patient agrees with plan and voiced no further concerns.     Regarding EPISTAXIS, for treatment, I advised the patient to: sit up and lean forward to prevent the swallowing of blood; spit blood and saliva into a bowl; apply pressure to nose using 2 fingers to pinch nose shut for 10 minutes; breathe through your mouth; apply ice (use a cold pack or put crushed ice in a bag, cover with a towel, and place on the bridge of nose); and consider nasal packing by using a cotton ball, tissue, or gauze bandage inserted into nostril to stop the bleeding. To prevent epistaxis: instructed patient to avoid nose picking and blowing nose too hard;  use a cool mist humidifier to add moisture to the air and help keep nose moist. Instructed patient to follow up with primary healthcare provider or otolaryngologist as soon as possible for further evaluation and treatment and to return to the ER if they experience difficulty breathing or talking, weakness, or for any complications or concerns.       I discussed with patient that evaluation in the ED does not suggest any emergent or life threatening medical conditions requiring immediate intervention beyond what was provided in the ED, and I believe patient is safe for discharge. Regardless, an unremarkable evaluation in the ED does not preclude the development or presence of a serious of life threatening  condition. As such, patient was instructed to return immediately for any worsening or change in current symptoms.                     Clinical Impression:  Final diagnoses:  [R04.0] Epistaxis (Primary)          ED Disposition Condition    Discharge Stable          ED Prescriptions    None       Follow-up Information       Follow up With Specialties Details Why Contact Info    Follow-up with your ENT in 2 days.        [place] - Emergency Dept Emergency Medicine  As needed, If symptoms worsen [id number]  Emergency Department  [facility] [id number] [phone]                   [1]   Social History  Tobacco Use    Smoking status: Never    Smokeless tobacco: Never   Substance Use Topics    Alcohol use: No    Drug use: No        Butch Santos NP  05/27/25 1957

## 2025-07-10 ENCOUNTER — HOSPITAL ENCOUNTER (OUTPATIENT)
Dept: RADIOLOGY | Facility: HOSPITAL | Age: 65
Discharge: HOME | End: 2025-07-10
Attending: NURSE PRACTITIONER
Payer: MEDICARE

## 2025-07-10 DIAGNOSIS — R10.12 LEFT UPPER QUADRANT ABDOMINAL PAIN: ICD-10-CM

## 2025-07-10 DIAGNOSIS — R10.812 LEFT UPPER QUADRANT ABDOMINAL TENDERNESS: ICD-10-CM

## 2025-07-10 PROCEDURE — 76700 US EXAM ABDOM COMPLETE: CPT | Mod: 26,,, | Performed by: STUDENT IN AN ORGANIZED HEALTH CARE EDUCATION/TRAINING PROGRAM

## 2025-07-10 PROCEDURE — 76700 US EXAM ABDOM COMPLETE: CPT | Mod: TC,PO

## 2025-08-21 DIAGNOSIS — R07.9 CHEST PAIN, UNSPECIFIED: Primary | ICD-10-CM

## 2025-08-22 ENCOUNTER — HOSPITAL ENCOUNTER (OUTPATIENT)
Dept: CARDIOLOGY | Facility: HOSPITAL | Age: 65
Discharge: HOME | End: 2025-08-22
Payer: MEDICARE

## 2025-08-22 DIAGNOSIS — R07.9 CHEST PAIN, UNSPECIFIED: ICD-10-CM

## 2025-08-22 LAB
OHS QRS DURATION: 78 MS
OHS QTC CALCULATION: 405 MS

## 2025-08-22 PROCEDURE — 93005 ELECTROCARDIOGRAM TRACING: CPT | Mod: PO

## 2025-08-22 PROCEDURE — 93010 ELECTROCARDIOGRAM REPORT: CPT | Mod: ,,, | Performed by: INTERNAL MEDICINE

## (undated) DEVICE — PROTECTOR ULNAR NERVE

## (undated) DEVICE — SEE MEDLINE ITEM 152622

## (undated) DEVICE — SEE MEDLINE ITEM 157027

## (undated) DEVICE — SOL 9P NACL IRR PIC IL

## (undated) DEVICE — ELECTRODE REM PLYHSV RETURN 9

## (undated) DEVICE — SEE MEDLINE ITEM 152739

## (undated) DEVICE — SEE MEDLINE ITEM 146420

## (undated) DEVICE — SUT CHROMIC 2-0 SH 27IN BRN

## (undated) DEVICE — GLOVE SURGICAL LATEX SZ 7

## (undated) DEVICE — NDL SAFETY 22G X 1.5 ECLIPSE

## (undated) DEVICE — PAD ABD 8X10 STERILE

## (undated) DEVICE — SPONGE DERMACEA GAUZE 4X4

## (undated) DEVICE — SOL IRR NACL .9% 3000ML

## (undated) DEVICE — NDL ECLIPSE SAFETY 18GX1-1/2IN

## (undated) DEVICE — LINER GLOVE POWDERFREE 8

## (undated) DEVICE — GLOVE BIOGEL ECLIPSE SZ 7.5

## (undated) DEVICE — GAUZE SPONGE 4X4 12PLY

## (undated) DEVICE — SOL NS 1000CC

## (undated) DEVICE — PACK DRAPE PERI/GYN TIBURON

## (undated) DEVICE — TUBE SPEC COLL&TRANSPORT 11ML

## (undated) DEVICE — COVER OVERHEAD SURG LT BLUE

## (undated) DEVICE — SYR 10CC LUER LOCK

## (undated) DEVICE — SYR 30CC LUER LOCK

## (undated) DEVICE — MANIFOLD 4 PORT

## (undated) DEVICE — SEE MEDLINE ITEM 157137

## (undated) DEVICE — SWAB CULTURETTE II DUAL

## (undated) DEVICE — SPONGE LAP 18X18 PREWASHED

## (undated) DEVICE — SEE MEDLINE ITEM 157117

## (undated) DEVICE — APPLICATOR CHLORAPREP ORN 26ML